# Patient Record
Sex: MALE | Race: WHITE | NOT HISPANIC OR LATINO | URBAN - METROPOLITAN AREA
[De-identification: names, ages, dates, MRNs, and addresses within clinical notes are randomized per-mention and may not be internally consistent; named-entity substitution may affect disease eponyms.]

---

## 2017-10-03 ENCOUNTER — EMERGENCY (EMERGENCY)
Facility: HOSPITAL | Age: 57
LOS: 0 days | Discharge: HOME | End: 2017-10-03
Admitting: INTERNAL MEDICINE

## 2017-10-03 ENCOUNTER — EMERGENCY (EMERGENCY)
Facility: HOSPITAL | Age: 57
LOS: 0 days | Discharge: HOME | End: 2017-10-04
Admitting: INTERNAL MEDICINE

## 2017-10-03 DIAGNOSIS — R51 HEADACHE: ICD-10-CM

## 2017-10-03 DIAGNOSIS — R94.31 ABNORMAL ELECTROCARDIOGRAM [ECG] [EKG]: ICD-10-CM

## 2017-10-03 DIAGNOSIS — F17.200 NICOTINE DEPENDENCE, UNSPECIFIED, UNCOMPLICATED: ICD-10-CM

## 2017-10-03 DIAGNOSIS — Z87.891 PERSONAL HISTORY OF NICOTINE DEPENDENCE: ICD-10-CM

## 2017-10-03 DIAGNOSIS — Z79.899 OTHER LONG TERM (CURRENT) DRUG THERAPY: ICD-10-CM

## 2017-10-03 DIAGNOSIS — K21.9 GASTRO-ESOPHAGEAL REFLUX DISEASE WITHOUT ESOPHAGITIS: ICD-10-CM

## 2017-10-03 DIAGNOSIS — I10 ESSENTIAL (PRIMARY) HYPERTENSION: ICD-10-CM

## 2017-10-03 DIAGNOSIS — R11.0 NAUSEA: ICD-10-CM

## 2017-10-04 ENCOUNTER — TRANSCRIPTION ENCOUNTER (OUTPATIENT)
Age: 57
End: 2017-10-04

## 2017-10-04 PROBLEM — Z00.00 ENCOUNTER FOR PREVENTIVE HEALTH EXAMINATION: Status: ACTIVE | Noted: 2017-10-04

## 2018-05-17 ENCOUNTER — APPOINTMENT (OUTPATIENT)
Dept: CARDIOLOGY | Facility: CLINIC | Age: 58
End: 2018-05-17

## 2021-11-29 ENCOUNTER — EMERGENCY (EMERGENCY)
Facility: HOSPITAL | Age: 61
LOS: 0 days | Discharge: HOME | End: 2021-11-29
Attending: STUDENT IN AN ORGANIZED HEALTH CARE EDUCATION/TRAINING PROGRAM | Admitting: STUDENT IN AN ORGANIZED HEALTH CARE EDUCATION/TRAINING PROGRAM
Payer: MEDICAID

## 2021-11-29 VITALS
SYSTOLIC BLOOD PRESSURE: 162 MMHG | HEART RATE: 73 BPM | DIASTOLIC BLOOD PRESSURE: 89 MMHG | RESPIRATION RATE: 18 BRPM | WEIGHT: 177.03 LBS | OXYGEN SATURATION: 98 % | TEMPERATURE: 98 F

## 2021-11-29 DIAGNOSIS — S01.112A LACERATION WITHOUT FOREIGN BODY OF LEFT EYELID AND PERIOCULAR AREA, INITIAL ENCOUNTER: ICD-10-CM

## 2021-11-29 DIAGNOSIS — S09.90XA UNSPECIFIED INJURY OF HEAD, INITIAL ENCOUNTER: ICD-10-CM

## 2021-11-29 DIAGNOSIS — R11.0 NAUSEA: ICD-10-CM
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
## 2021-11-29 DIAGNOSIS — R55 SYNCOPE AND COLLAPSE: ICD-10-CM
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
## 2021-11-29 DIAGNOSIS — F10.129 ALCOHOL ABUSE WITH INTOXICATION, UNSPECIFIED: ICD-10-CM
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
## 2021-11-29 DIAGNOSIS — Z23 ENCOUNTER FOR IMMUNIZATION: ICD-10-CM

## 2021-11-29 DIAGNOSIS — S01.81XA LACERATION WITHOUT FOREIGN BODY OF OTHER PART OF HEAD, INITIAL ENCOUNTER: ICD-10-CM

## 2021-11-29 LAB
ALBUMIN SERPL ELPH-MCNC: 4.4 G/DL — SIGNIFICANT CHANGE UP (ref 3.5–5.2)
ALP SERPL-CCNC: 91 U/L — SIGNIFICANT CHANGE UP (ref 30–115)
ALT FLD-CCNC: 17 U/L — SIGNIFICANT CHANGE UP (ref 0–41)
ANION GAP SERPL CALC-SCNC: 23 MMOL/L — HIGH (ref 7–14)
AST SERPL-CCNC: 29 U/L — SIGNIFICANT CHANGE UP (ref 0–41)
BASOPHILS # BLD AUTO: 0.1 K/UL — SIGNIFICANT CHANGE UP (ref 0–0.2)
BASOPHILS NFR BLD AUTO: 0.6 % — SIGNIFICANT CHANGE UP (ref 0–1)
BILIRUB SERPL-MCNC: 0.3 MG/DL — SIGNIFICANT CHANGE UP (ref 0.2–1.2)
BUN SERPL-MCNC: 21 MG/DL — HIGH (ref 10–20)
CALCIUM SERPL-MCNC: 9.8 MG/DL — SIGNIFICANT CHANGE UP (ref 8.5–10.1)
CHLORIDE SERPL-SCNC: 104 MMOL/L — SIGNIFICANT CHANGE UP (ref 98–110)
CO2 SERPL-SCNC: 13 MMOL/L — LOW (ref 17–32)
CREAT SERPL-MCNC: 1.4 MG/DL — SIGNIFICANT CHANGE UP (ref 0.7–1.5)
EOSINOPHIL # BLD AUTO: 0.07 K/UL — SIGNIFICANT CHANGE UP (ref 0–0.7)
EOSINOPHIL NFR BLD AUTO: 0.4 % — SIGNIFICANT CHANGE UP (ref 0–8)
GLUCOSE SERPL-MCNC: 99 MG/DL — SIGNIFICANT CHANGE UP (ref 70–99)
HCT VFR BLD CALC: 50 % — SIGNIFICANT CHANGE UP (ref 42–52)
HGB BLD-MCNC: 16.3 G/DL — SIGNIFICANT CHANGE UP (ref 14–18)
IMM GRANULOCYTES NFR BLD AUTO: 1.1 % — HIGH (ref 0.1–0.3)
LYMPHOCYTES # BLD AUTO: 1.65 K/UL — SIGNIFICANT CHANGE UP (ref 1.2–3.4)
LYMPHOCYTES # BLD AUTO: 10.4 % — LOW (ref 20.5–51.1)
MCHC RBC-ENTMCNC: 30.9 PG — SIGNIFICANT CHANGE UP (ref 27–31)
MCHC RBC-ENTMCNC: 32.6 G/DL — SIGNIFICANT CHANGE UP (ref 32–37)
MCV RBC AUTO: 94.7 FL — HIGH (ref 80–94)
MONOCYTES # BLD AUTO: 0.96 K/UL — HIGH (ref 0.1–0.6)
MONOCYTES NFR BLD AUTO: 6 % — SIGNIFICANT CHANGE UP (ref 1.7–9.3)
NEUTROPHILS # BLD AUTO: 12.94 K/UL — HIGH (ref 1.4–6.5)
NEUTROPHILS NFR BLD AUTO: 81.5 % — HIGH (ref 42.2–75.2)
NRBC # BLD: 0 /100 WBCS — SIGNIFICANT CHANGE UP (ref 0–0)
PLATELET # BLD AUTO: 265 K/UL — SIGNIFICANT CHANGE UP (ref 130–400)
POTASSIUM SERPL-MCNC: 4.3 MMOL/L — SIGNIFICANT CHANGE UP (ref 3.5–5)
POTASSIUM SERPL-SCNC: 4.3 MMOL/L — SIGNIFICANT CHANGE UP (ref 3.5–5)
PROT SERPL-MCNC: 7.3 G/DL — SIGNIFICANT CHANGE UP (ref 6–8)
RBC # BLD: 5.28 M/UL — SIGNIFICANT CHANGE UP (ref 4.7–6.1)
RBC # FLD: 13.1 % — SIGNIFICANT CHANGE UP (ref 11.5–14.5)
SODIUM SERPL-SCNC: 140 MMOL/L — SIGNIFICANT CHANGE UP (ref 135–146)
TROPONIN T SERPL-MCNC: <0.01 NG/ML — SIGNIFICANT CHANGE UP
WBC # BLD: 15.9 K/UL — HIGH (ref 4.8–10.8)
WBC # FLD AUTO: 15.9 K/UL — HIGH (ref 4.8–10.8)

## 2021-11-29 PROCEDURE — 99285 EMERGENCY DEPT VISIT HI MDM: CPT | Mod: 25

## 2021-11-29 PROCEDURE — 93010 ELECTROCARDIOGRAM REPORT: CPT

## 2021-11-29 PROCEDURE — 12011 RPR F/E/E/N/L/M 2.5 CM/<: CPT

## 2021-11-29 PROCEDURE — 70450 CT HEAD/BRAIN W/O DYE: CPT | Mod: 26,MA

## 2021-11-29 PROCEDURE — 72125 CT NECK SPINE W/O DYE: CPT | Mod: 26,MA

## 2021-11-29 PROCEDURE — 70486 CT MAXILLOFACIAL W/O DYE: CPT | Mod: 26,MA

## 2021-11-29 RX ORDER — ACETAMINOPHEN 500 MG
975 TABLET ORAL ONCE
Refills: 0 | Status: COMPLETED | OUTPATIENT
Start: 2021-11-29 | End: 2021-11-29

## 2021-11-29 RX ORDER — TETANUS TOXOID, REDUCED DIPHTHERIA TOXOID AND ACELLULAR PERTUSSIS VACCINE, ADSORBED 5; 2.5; 8; 8; 2.5 [IU]/.5ML; [IU]/.5ML; UG/.5ML; UG/.5ML; UG/.5ML
0.5 SUSPENSION INTRAMUSCULAR ONCE
Refills: 0 | Status: COMPLETED | OUTPATIENT
Start: 2021-11-29 | End: 2021-11-29

## 2021-11-29 RX ADMIN — Medication 975 MILLIGRAM(S): at 20:07

## 2021-11-29 RX ADMIN — TETANUS TOXOID, REDUCED DIPHTHERIA TOXOID AND ACELLULAR PERTUSSIS VACCINE, ADSORBED 0.5 MILLILITER(S): 5; 2.5; 8; 8; 2.5 SUSPENSION INTRAMUSCULAR at 20:08

## 2021-11-29 NOTE — ED PROVIDER NOTE - PATIENT PORTAL LINK FT
You can access the FollowMyHealth Patient Portal offered by [facility] by registering at the following website: [email or web address]. By joining [employer]’s FollowMyHealth portal, you will also be able to view your health information using other applications (apps) compatible with our system.

## 2021-11-29 NOTE — ED PROVIDER NOTE - NSFOLLOWUPINSTRUCTIONS_ED_ALL_ED_FT
Laceration    A laceration is a cut that goes through all of the layers of the skin and into the tissue that is right under the skin. Some lacerations heal on their own. Others need to be closed with skin adhesive strips, skin glue, stitches (sutures), or staples. Proper laceration care minimizes the risk of infection and helps the laceration to heal better.  If non-absorbable stitches or staples have been placed, they must be taken out within the time frame instructed by your healthcare provider.    SEEK IMMEDIATE MEDICAL CARE IF YOU HAVE ANY OF THE FOLLOWING SYMPTOMS: swelling around the wound, worsening pain, drainage from the wound, red streaking going away from your wound, inability to move finger or toe near the laceration, or discoloration of skin near the laceration.    Syncope    Syncope is when you temporarily lose consciousness, also called fainting or passing out. It is caused by a sudden decrease in blood flow to the brain. Even though most causes of syncope are not dangerous, syncope can possibly be a sign of a serious medical problem. Signs that you may be about to faint include feeling dizzy, lightheaded, nausea, visual changes, or cold/clammy skin. Do not drive, operate heavy machinery, or play sports until your health care provider says it is okay.    SEEK IMMEDIATE MEDICAL CARE IF YOU HAVE ANY OF THE FOLLOWING SYMPTOMS: severe headache, pain in your chest/abdomen/back, bleeding from your mouth or rectum, palpitations, shortness of breath, pain with breathing, seizure, confusion, or trouble walking.

## 2021-11-29 NOTE — ED PROVIDER NOTE - CARE PROVIDER_API CALL
Brianne Lawson)  Internal Medicine  [address] [place] [address]  Phone: [phone]  Fax: [phone]  Follow Up Time: 1-3 Days    Daryl Werner  Cardiology  [address] [place] [address]  Phone: [phone]  Fax: [phone]  Follow Up Time: 1-3 Days    Yue Vaughan)  [facility]  [address] [place] [id number]  Phone: [phone]  Fax: [phone]  Follow Up Time: 1-3 Days

## 2021-11-29 NOTE — ED PROVIDER NOTE - ATTENDING CONTRIBUTION TO CARE
61 year old male no pmh presents here s/p mechanical fall. Patient states he had 1 drink of titors and cranberry, tripped over [employer] decorations and fell. + head trauma not on a/c initially no loc. PAtient later smoked marijuana felt lightheaded & synopsized. Patient endorses skippin a meal today. No blurry vision no neck pain numbnes/stingling cp sob n/v abdominal pain. Tetanas not up to date  on exam  CONSTITUTIONAL: WA / WN / NAD  HEAD: +left eyebrow hematoma 1cm laceration + abrasion on left maxilla.  EYES: PERRL; EOMI; anicteric.  ENT: Normal pharynx; mucous membranes pink/moist, no erythema.  NECK: Supple; no c spine ttp  CARD: RRR; nl S1/S2; no M/R/G.   RESP: Respiratory rate and effort are normal; breath sounds clear and equal bilaterally.  ABD: Soft, NT ND   MSK/EXT: No gross deformities; full range of motion. No midline CTLS  SKIN: Warm and dry;   NEURO: AAOx3, Motor 5/5 x 4 extremities No dymsetria no dysarthria negative drift. Ambulating in the ED  PSYCH: Memory Intact, Normal Affect

## 2021-11-29 NOTE — ED ADULT TRIAGE NOTE - CHIEF COMPLAINT QUOTE
Patient was decorating his house for [employer] and had a trip and fall onto sidewalk. Has a laceration to left eyebrow. Patient then had an episode of dizziness and daughter caught him. No dizziness at this time.

## 2021-11-29 NOTE — ED PROVIDER NOTE - CARE PROVIDERS DIRECT ADDRESSES
[email or web address]

## 2021-11-29 NOTE — ED ADULT NURSE NOTE - CHIEF COMPLAINT QUOTE
Patient was decorating his house for [employer] and had a trip and fall onto sidewalk. Has a laceration to left eyebrow. Patient then had an episode of dizziness and daughter caught him. No dizziness at this time.

## 2021-11-29 NOTE — ED PROVIDER NOTE - CLINICAL SUMMARY MEDICAL DECISION MAKING FREE TEXT BOX
61 year old male no pmh presents here s/p mechanical fall. Patient states he had 1 drink of titors and cranberry, tripped over [employer] decorations and fell. + head trauma not on a/c initially no loc. PAtient later smoked marijuana felt lightheaded & synopsized. Patient endorses skippin a meal today. No blurry vision no neck pain numbnes/stingling cp sob n/v abdominal pain.   VS reviewed. [facility] imaging ekg obtained and reviewed. 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 It is not clear whether this is acute or chronic. Please correlate with point tenderness. patient re-evaluated. patient has no midline C spine or parapsinal muscle ttp. Results discussed with patient and wife together. No pain able to freely move neck. Does not want to stay. Wife has a cardiologist dr palm that they will be following up for syncope. Strict return precautions given to wife and patient. 61 year old male no pmh presents here s/p mechanical fall. Patient states he had 1 drink of titors and cranberry, tripped over [employer] decorations and fell. + head trauma not on a/c initially no loc. PAtient later smoked marijuana felt lightheaded & synopsized. Patient endorses skippin a meal today. No blurry vision no neck pain numbnes/stingling cp sob n/v abdominal pain.   VS reviewed. [facility] imaging ekg obtained and reviewed. 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 It is not clear whether this is acute or chronic. Please correlate with point tenderness. patient re-evaluated. patient has no midline C spine or parapsinal muscle ttp. Results discussed with patient and wife together. No pain able to freely move neck. Does not want to stay. Wife has a cardiologist dr palm that they will be following up for syncope. Strict return precautions given to wife and patient. Laceration repaired with 3 sutures.

## 2021-11-29 NOTE — ED PROVIDER NOTE - CARE PLAN
1 Principal Discharge DX:	Laceration of left eyebrow  Secondary Diagnosis:	Fall  Secondary Diagnosis:	Syncope

## 2021-11-29 NOTE — ED PROVIDER NOTE - PROGRESS NOTE DETAILS
SR: CT scans noted-  patient re-evaluated. patient has no midline C spine or parapsinal muscle ttp. Results discussed with patient and wife together. No pain able to freely move neck. Does not want to stay. Wife has a cardiologist dr palm that they will be following up for syncope. Strict return precautions given to wife and patient.

## 2021-11-29 NOTE — ED PROVIDER NOTE - PROVIDER TOKENS
PROVIDER:[TOKEN:[[id number]],FOLLOWUP:[1-3 Days]],PROVIDER:[TOKEN:[[id number]],FOLLOWUP:[1-3 Days]],PROVIDER:[TOKEN:[[id number]],FOLLOWUP:[1-3 Days]]

## 2021-11-29 NOTE — ED PROVIDER NOTE - PHYSICAL EXAMINATION
GENERAL: Well-nourished, Well-developed. NAD.  HEAD: (+)L eyebrow 1.5 cm linear laceration and hematoma. (+)L maxillary abrasion and ttp. No ecchymosis behind ears B/L.  Eyes: PERRLA, EOMI, no entrapment. No asymmetry. No nystagmus. No conjunctival injection. Non-icteric sclera.  ENMT: MMM.  Neck: Supple. No cervical midline TTP. No paravertebral TTP to traps. FROM  CVS: Normal S1,S2. No murmurs appreciated on auscultation   RESP: No use of accessory muscles. Chest rise symmetrical with good expansion. Lungs clear to auscultation B/L. No wheezing, rales, or rhonchi auscultated.  GI: Normal auscultation of bowel sounds in all 4 quadrants. Soft, Nontender, Nondistended. No guarding or rebound tenderness.   MSK: Extremities w/o deformity or ttp. No visible signs of trauma such as ecchymosis, erythema, or swelling. FROM of upper and lower extremities B/L. No midline spinal TTP. FROM of back with flexion and extension.  Skin: (+)L eyebrow 1.5cm laceration. (+)L maxillary region abrasion  Neuro: AA&O x 3. CNs II-XII grossly intact. Speaking in full sentences. No slurring of speech. No facial droop. No tremors. Sensation grossly intact. Strength 5/5 B/L. Gait within normal limits. Negative Romberg and Pronator Drift. Normal Finger to nose exam. Visual fields intact.

## 2021-11-29 NOTE — ED PROVIDER NOTE - IV ALTEPLASE INCLUSION HIDDEN
show Back Pain    Back pain is very common in adults. The cause of back pain is rarely dangerous and the pain often gets better over time. The cause of your back pain may not be known and may include strain of muscles or ligaments, degeneration of the spinal disks, or arthritis. Occasionally the pain may radiate down your leg(s). Over-the-counter medicines to reduce pain and inflammation are often the most helpful. Stretching and remaining active frequently helps the healing process.     SEEK IMMEDIATE MEDICAL CARE IF YOU HAVE ANY OF THE FOLLOWING SYMPTOMS: bowel or bladder control problems, unusual weakness or numbness in your arms or legs, nausea or vomiting, abdominal pain, fever, dizziness/lightheadedness.      1)  PLEASE FOLLOW-UP WITH YOUR PRIMARY CARE DOCTOR OR REFERRED SPECIALIST IN 1-2 DAYS.     2)  BRING ALL PAPERWORK FROM TODAY'S EMERGENCY ROOM  VISIT TO YOUR FOLLOW-UP VISIT.  IF YOU DO NOT HAVE A PRIMARY CARE DOCTOR PLEASE REFER TO THE OFFICE/CLINIC INFORMATION GIVEN ABOVE.  YOU MAY ALSO CALL [phone] AND ASK FOR MS. JOSE SANCHEZ.  SHE CAN HELP YOU MAKE A FOLLOW-UP APPOINTMENT.  HER HOURS ARE 11AM-7PM MONDAY - FRIDAY.    3) PLEASE RETURN TO THE ER IMMEDIATELY OR CALL 911 FOR ANY HIGH FEVER, TROUBLE BREATHING, CHEST PAIN, WEAKNESS, VOMITING, SEVERE PAIN, OR ANY OTHER CONCERNS.

## 2021-11-29 NOTE — ED PROVIDER NOTE - NS ED ROS FT
Constitutional: (-) fever (-) malaise (-) diaphoresis (-) chills (-) wt. loss (-) bodyaches (-) night sweats  Eyes: (-) visual changes (-) eye pain (-) eye discharge (-) photophobia (-) FB sensation  Neck: (-) neck pain (-) neck stiffness  Cardiac: (-) chest pain  (-) palpitations (+) syncope (-) edema  Respiratory: (-) cough (-) hemoptysis (-) history of asthma or COPD (-) SOB (-) RUSSELL  GI: (+) nausea (-) vomiting (-) diarrhea (-) abdominal pain   : (-) dysuria (-) increased frequency  (-) hematuria (-) incontinence  MS: (-) back pain (-) myalgia (-) muscle weakness (-)  joint pain  Neuro: (-) headache (-) dizziness (-) numbness/tingling to extremities B/L (-) weakness (-) LOC (+) head injury (-) AMS   Skin: (-) rash (+) laceration    Except as documented in the HPI, all other systems are negative.

## 2021-12-04 ENCOUNTER — EMERGENCY (EMERGENCY)
Facility: HOSPITAL | Age: 61
LOS: 0 days | Discharge: HOME | End: 2021-12-04
Attending: EMERGENCY MEDICINE | Admitting: EMERGENCY MEDICINE
Payer: MEDICAID

## 2021-12-04 VITALS
HEART RATE: 85 BPM | RESPIRATION RATE: 16 BRPM | OXYGEN SATURATION: 96 % | TEMPERATURE: 97 F | DIASTOLIC BLOOD PRESSURE: 114 MMHG | WEIGHT: 177.03 LBS | SYSTOLIC BLOOD PRESSURE: 199 MMHG

## 2021-12-04 VITALS
RESPIRATION RATE: 18 BRPM | OXYGEN SATURATION: 97 % | DIASTOLIC BLOOD PRESSURE: 102 MMHG | SYSTOLIC BLOOD PRESSURE: 170 MMHG | HEART RATE: 69 BPM | TEMPERATURE: 98 F
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
## 2021-12-04 DIAGNOSIS — S01.112D LACERATION WITHOUT FOREIGN BODY OF LEFT EYELID AND PERIOCULAR AREA, SUBSEQUENT ENCOUNTER: ICD-10-CM

## 2021-12-04 DIAGNOSIS — R11.2 NAUSEA WITH VOMITING, UNSPECIFIED: ICD-10-CM

## 2021-12-04 DIAGNOSIS — I16.0 HYPERTENSIVE URGENCY: ICD-10-CM
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
## 2021-12-04 LAB
ALBUMIN SERPL ELPH-MCNC: 4.3 G/DL — SIGNIFICANT CHANGE UP (ref 3.5–5.2)
ALP SERPL-CCNC: 95 U/L — SIGNIFICANT CHANGE UP (ref 30–115)
ALT FLD-CCNC: 19 U/L — SIGNIFICANT CHANGE UP (ref 0–41)
ANION GAP SERPL CALC-SCNC: 14 MMOL/L — SIGNIFICANT CHANGE UP (ref 7–14)
AST SERPL-CCNC: 21 U/L — SIGNIFICANT CHANGE UP (ref 0–41)
BASOPHILS # BLD AUTO: 0.05 K/UL — SIGNIFICANT CHANGE UP (ref 0–0.2)
BASOPHILS NFR BLD AUTO: 0.5 % — SIGNIFICANT CHANGE UP (ref 0–1)
BILIRUB SERPL-MCNC: 0.6 MG/DL — SIGNIFICANT CHANGE UP (ref 0.2–1.2)
BUN SERPL-MCNC: 25 MG/DL — HIGH (ref 10–20)
CALCIUM SERPL-MCNC: 8.9 MG/DL — SIGNIFICANT CHANGE UP (ref 8.5–10.1)
CHLORIDE SERPL-SCNC: 102 MMOL/L — SIGNIFICANT CHANGE UP (ref 98–110)
CO2 SERPL-SCNC: 23 MMOL/L — SIGNIFICANT CHANGE UP (ref 17–32)
CREAT SERPL-MCNC: 1.2 MG/DL — SIGNIFICANT CHANGE UP (ref 0.7–1.5)
EOSINOPHIL # BLD AUTO: 0.22 K/UL — SIGNIFICANT CHANGE UP (ref 0–0.7)
EOSINOPHIL NFR BLD AUTO: 2.1 % — SIGNIFICANT CHANGE UP (ref 0–8)
GLUCOSE SERPL-MCNC: 112 MG/DL — HIGH (ref 70–99)
HCT VFR BLD CALC: 49.8 % — SIGNIFICANT CHANGE UP (ref 42–52)
HGB BLD-MCNC: 16.7 G/DL — SIGNIFICANT CHANGE UP (ref 14–18)
IMM GRANULOCYTES NFR BLD AUTO: 0.6 % — HIGH (ref 0.1–0.3)
LYMPHOCYTES # BLD AUTO: 1.39 K/UL — SIGNIFICANT CHANGE UP (ref 1.2–3.4)
LYMPHOCYTES # BLD AUTO: 13.4 % — LOW (ref 20.5–51.1)
MCHC RBC-ENTMCNC: 31 PG — SIGNIFICANT CHANGE UP (ref 27–31)
MCHC RBC-ENTMCNC: 33.5 G/DL — SIGNIFICANT CHANGE UP (ref 32–37)
MCV RBC AUTO: 92.6 FL — SIGNIFICANT CHANGE UP (ref 80–94)
MONOCYTES # BLD AUTO: 0.84 K/UL — HIGH (ref 0.1–0.6)
MONOCYTES NFR BLD AUTO: 8.1 % — SIGNIFICANT CHANGE UP (ref 1.7–9.3)
NEUTROPHILS # BLD AUTO: 7.8 K/UL — HIGH (ref 1.4–6.5)
NEUTROPHILS NFR BLD AUTO: 75.3 % — HIGH (ref 42.2–75.2)
NRBC # BLD: 0 /100 WBCS — SIGNIFICANT CHANGE UP (ref 0–0)
PLATELET # BLD AUTO: 255 K/UL — SIGNIFICANT CHANGE UP (ref 130–400)
POTASSIUM SERPL-MCNC: 3.5 MMOL/L — SIGNIFICANT CHANGE UP (ref 3.5–5)
POTASSIUM SERPL-SCNC: 3.5 MMOL/L — SIGNIFICANT CHANGE UP (ref 3.5–5)
PROT SERPL-MCNC: 6.6 G/DL — SIGNIFICANT CHANGE UP (ref 6–8)
RBC # BLD: 5.38 M/UL — SIGNIFICANT CHANGE UP (ref 4.7–6.1)
RBC # FLD: 13.2 % — SIGNIFICANT CHANGE UP (ref 11.5–14.5)
SARS-COV-2 RNA SPEC QL NAA+PROBE: SIGNIFICANT CHANGE UP
SODIUM SERPL-SCNC: 139 MMOL/L — SIGNIFICANT CHANGE UP (ref 135–146)
TROPONIN T SERPL-MCNC: <0.01 NG/ML — SIGNIFICANT CHANGE UP
WBC # BLD: 10.36 K/UL — SIGNIFICANT CHANGE UP (ref 4.8–10.8)
WBC # FLD AUTO: 10.36 K/UL — SIGNIFICANT CHANGE UP (ref 4.8–10.8)

## 2021-12-04 PROCEDURE — 93010 ELECTROCARDIOGRAM REPORT: CPT

## 2021-12-04 PROCEDURE — 99285 EMERGENCY DEPT VISIT HI MDM: CPT

## 2021-12-04 PROCEDURE — 70450 CT HEAD/BRAIN W/O DYE: CPT | Mod: 26,MA

## 2021-12-04 RX ORDER — LABETALOL HCL 100 MG
10 TABLET ORAL ONCE
Refills: 0 | Status: COMPLETED | OUTPATIENT
Start: 2021-12-04 | End: 2021-12-04

## 2021-12-04 RX ADMIN — Medication 10 MILLIGRAM(S): at 19:11

## 2021-12-04 NOTE — ED PROVIDER NOTE - CARE PLAN
1 Principal Discharge DX:	Visit for suture removal   Principal Discharge DX:	Visit for suture removal  Secondary Diagnosis:	Hypertensive urgency

## 2021-12-04 NOTE — ED ADULT NURSE NOTE - NSIMPLEMENTINTERV_GEN_ALL_ED
Implemented All Universal Safety Interventions:  [place] to call system. Call bell, personal items and telephone within reach. Instruct patient to call for assistance. Room bathroom lighting operational. Non-slip footwear when patient is off stretcher. Physically safe environment: no spills, clutter or unnecessary equipment. Stretcher in lowest position, wheels locked, appropriate side rails in place.

## 2021-12-04 NOTE — ED PROVIDER NOTE - NS ED ROS FT
CONST: No fever, chills or bodyaches  EYES: No pain, redness, drainage or visual changes.  ENT: No ear pain or discharge, nasal discharge or congestion. No sore throat  CARD: No chest pain, palpitations  RESP: No SOB, cough, hemoptysis. No hx of asthma or COPD  GI: No abdominal pain, (+) N/V. No Diarrhea  : No urinary symptoms  MS: No joint pain, back pain or extremity pain/injury  SKIN: No rashes  NEURO: No headache, dizziness, paresthesias or LOC

## 2021-12-04 NOTE — ED ADULT NURSE REASSESSMENT NOTE - NS ED NURSE REASSESS COMMENT FT1
patient requested to leave AMA. MD made aware. Patient educated on the dangers and even possible death by leaving AMA. Patient verbalized understanding and still requested to leave. AMA. Patient signed all paperwork.  Patient A&Ox4, ambulates with steady gait.  IV access removed and pressure bandage applied. No bleeding noted.

## 2021-12-04 NOTE — ED PROVIDER NOTE - PHYSICAL EXAMINATION
Physical Exam    Vital Signs: I have reviewed the initial vital signs.  Constitutional: well-nourished, appears stated age, no acute distress  Eyes: Conjunctiva pink, Sclera clear, PERRLA, EOMI without pain. negative hyphema. (+) healing left subconjunctival hemorrhage.  ENT: Oropharynx is clear with lesions. uvula midline. no tonsillar erythema, edema, or exudates. no stridor. pta pta. floor of the mouth is soft without pus.   Cardiovascular: S1 and S2, regular rate, regular rhythm, well-perfused extremities, radial pulses equal and 2+ b/l.   Respiratory: unlabored respiratory effort, clear to auscultation bilaterally no wheezing, rales and rhonchi. pt is speaking full sentences. no accessory muscle use.   Gastrointestinal: soft, non-tender, nondistended abdomen, no pulsatile mass, normal bowl sounds, no rebound, no guarding, no organomegaly.   Musculoskeletal: FROM of b/l upper and lower extremities.   Integumentary: warm, dry, no rash. (+) scab inferior to the left orbit healing well. laceration to the left eyebrow scabbed over and healing well. no erythema, edema, or drainage from the sites.   Neurologic: awake, alert, cranial nerves II-XII grossly intact, extremities’ motor and sensory functions grossly intact. finger to nose intact. negative pronator drift. negative romberg. steady gait. 5/5 strength throughout.   Psychiatric: appropriate mood, appropriate affect Physical Exam    Vital Signs: I have reviewed the initial vital signs.  Constitutional: well-nourished, appears stated age, no acute distress  Eyes: Conjunctiva pink, Sclera clear, PERRLA, EOMI without pain. negative hyphema. (+) healing left subconjunctival hemorrhage.  Cardiovascular: S1 and S2, regular rate, regular rhythm, well-perfused extremities, radial pulses equal and 2+ b/l.   Respiratory: unlabored respiratory effort, clear to auscultation bilaterally no wheezing, rales and rhonchi. pt is speaking full sentences. no accessory muscle use.   Gastrointestinal: soft, non-tender, nondistended abdomen, no pulsatile mass, normal bowl sounds, no rebound, no guarding, no organomegaly.   Musculoskeletal: FROM of b/l upper and lower extremities.   Integumentary: warm, dry, no rash. (+) scab inferior to the left orbit healing well. laceration to the left eyebrow scabbed over and healing well. no erythema, edema, or drainage from the sites.   Neurologic: atraumatic, normocephalic skull. awake, alert, cranial nerves II-XII grossly intact, extremities’ motor and sensory functions grossly intact. finger to nose intact. negative pronator drift. negative romberg. steady gait. 5/5 strength throughout.   Psychiatric: appropriate mood, appropriate affect

## 2021-12-04 NOTE — ED PROVIDER NOTE - NSFOLLOWUPCLINICS_GEN_ALL_ED_FT
[facility]  Concussion Program  [address] [place] [address]   Phone: [phone]  Fax:   Follow Up Time: 1-3 Days

## 2021-12-04 NOTE — ED PROVIDER NOTE - PROGRESS NOTE DETAILS
ATTENDING NOTE: I personally evaluated the patient. I reviewed the Physician Assistant’s note (as assigned above), and agree with the findings and plan except as documented in my note.   62 y/o M presented to the ED 4 days ago s/p mechanical trip and fall with (+) head trauma, sustaining a laceration over his L eyebrow, had 3 sutures placed and was DC’d now returned for suture removal from eyebrow. In ED, pt notes increased fatigue and vomiting today. Notes one episode of vomiting yesterday and one today.   Const: Well nourished, well developed, appears stated age. Eyes: PERRL, no conjunctival injection. HENT:  Neck supple without meningismus. CV: RRR, Warm, well-perfused extremities. RESP: CTA B/L, no tachypnea. GI: soft, non-tender, non-distended. MSK: No gross deformities appreciated. Skin: Warm, dry. (+) 3 sutures to his left eyebrow c/d/i with some ecchymosis and scab around L eye, and injected conjunctiva. No rashes. AAOx3. Motor 5/5 and sensation intact throughout UE and LE. CN II-XII intact. No facial droop or slurring of speech. No dysmetria w/ ftn or rapid alternating fine movements, ambulating with no ataxia or difficulty. NIH O. Psych: Appropriate mood and affect.   Concern for concussion, however, will do repeat CT to reassure pt doesn’t have ICH. FF: discussed radiology results with pt. sutures removed. advised of return precautions. agreeable to dc. F: discussed radiology results with pt. sutures removed. pt repeat blood pressure is still elevate. will evaluate for hypertensive urgency/emergency. will reasses. FF: pt blood pressure still high. discussed plan to admit pt for hypertensive urgency. pt reports he wants to sign out ama. pt made aware of the risk of signing out ama, including death. pt aware of risk and insist on signing out ama. pt is capable of making decision for himself. a&o x3. pt wife is at bedside. pt reports he will f/u with pcp, and will make an appt with wifes cardiologist. dr. ernandez. signed ama papers. FF: pt blood pressure still high. discussed plan to admit pt for hypertensive urgency. pt reports he wants to sign out ama. pt made aware of the risk of signing out ama, including death. pt aware of risk and insist on signing out ama. pt is capable of making decision for himself. a&o x3. pt wife is at bedside. pt reports he will f/u with pcp, and will make an appt with wife's cardiologist. dr. ernandez. signed ama papers.

## 2021-12-04 NOTE — ED PROVIDER NOTE - ATTENDING CONTRIBUTION TO CARE
ATTENDING NOTE: I personally evaluated the patient. I reviewed the Physician Assistant’s note (as assigned above), and agree with the findings and plan except as documented in my note.     60 y/o M presented to the ED 4 days ago s/p mechanical trip and fall with (+) head trauma, sustaining a laceration over his L eyebrow, had 3 sutures placed and was DC’d now returned for suture removal from eyebrow. In ED, pt notes increased fatigue and vomiting today. Notes one episode of vomiting yesterday and one today.     Const: Well nourished, well developed, appears stated age. Eyes: PERRL, no conjunctival injection. HENT:  Neck supple without meningismus. CV: RRR, Warm, well-perfused extremities. RESP: CTA B/L, no tachypnea. GI: soft, non-tender, non-distended. MSK: No gross deformities appreciated. Skin: Warm, dry. (+) 3 sutures to his left eyebrow c/d/i with some ecchymosis and scab around L eye, and injected conjunctiva. No rashes. AAOx3. Motor 5/5 and sensation intact throughout UE and LE. CN II-XII intact. No facial droop or slurring of speech. No dysmetria w/ ftn or rapid alternating fine movements, ambulating with no ataxia or difficulty. NIH O. Psych: Appropriate mood and affect.     Concern for concussion, however, will do repeat CT to reassure pt doesn’t have ICH.

## 2021-12-04 NOTE — ED PROVIDER NOTE - PATIENT PORTAL LINK FT
You can access the FollowMyHealth Patient Portal offered by [facility] by registering at the following website: [email or web address]. By joining [employer]’s FollowMyHealth portal, you will also be able to view your health information using other applications (apps) compatible with our system. You can access the FollowMyHealth Patient Portal offered by [facility] by registering at the following website: [email or web address]. By joining [employer]’s FollowMyHealth portal, you will also be able to view your health information using other applications (apps) compatible with our system.

## 2021-12-04 NOTE — ED PROVIDER NOTE - CLINICAL SUMMARY MEDICAL DECISION MAKING FREE TEXT BOX
62 yo M presented to ED for suture removal. Due to episodes of emesis concern for concussion. Repeat CT head did  not demonstrate any acute abnormality. DC home with concussion clinic. 60 yo M presented to ED for suture removal and emesis. Initially concern was for concussion vs delayed ICH. CT head did not demonstrate any acute pathology. Pt was found to have elevated BP without previous history of HTN. Labs were normal. EKG demonstrated some T-wave inversions. Pt given labetalol with some improvement of BP. Concern for HTN urgency and wanted to admit pt for further evaluation and management. Pt did not want to stay. Informed pt that elevated BP could cause CVA and ICH which could lead to death. PT understands but wants to leave anyway. Pt signed AMA

## 2021-12-04 NOTE — ED ADULT TRIAGE NOTE - CHIEF COMPLAINT QUOTE
Pt here to have sutures removed from left eyebrow. Pt denies signs of infection/pain. Pt fell on Monday, had sutures placed here - now stating he feels increased fatigue & vomited today.

## 2021-12-04 NOTE — ED PROVIDER NOTE - OBJECTIVE STATEMENT
60 y/o male with no significant PMH presents to the ED for evaluation of suture removal s/p mechanical fall 11/29. pt was seen in the ED and has 3 sutures placed to the left eyebrow. pt reports he went to [employer] last night and drank alcohol. pt reports he threw up twice last night. pt denies additional trauma, use of blood thinners, visual changes, headache, chest pain, sob, abdominal pain, current nausea, dairrhea, constipation, dizziness, weakness, numbness, tingling, urinary or bowel retention or incontinence. 60 y/o male with no significant PMH presents to the ED for evaluation of suture removal s/p mechanical fall 11/29. pt was seen in the ED and has 3 sutures placed to the left eyebrow. pt reports he went to [employer] last night and drank alcohol. pt reports he threw up twice last night. pt denies additional trauma, use of blood thinners, visual changes, headache, chest pain, sob, abdominal pain, current nausea, diarrhea, constipation, dizziness, weakness, numbness, tingling, urinary or bowel retention or incontinence.

## 2021-12-09 ENCOUNTER — APPOINTMENT (OUTPATIENT)
Dept: CARDIOLOGY | Facility: CLINIC | Age: 61
End: 2021-12-09
Payer: MEDICAID

## 2021-12-09 ENCOUNTER — RESULT CHARGE (OUTPATIENT)
Age: 61
End: 2021-12-09

## 2021-12-09 VITALS
TEMPERATURE: 97.1 F | WEIGHT: 168 LBS | HEIGHT: 63 IN | HEART RATE: 77 BPM | BODY MASS INDEX: 29.77 KG/M2 | SYSTOLIC BLOOD PRESSURE: 150 MMHG | DIASTOLIC BLOOD PRESSURE: 96 MMHG

## 2021-12-09 PROCEDURE — 93000 ELECTROCARDIOGRAM COMPLETE: CPT

## 2021-12-09 PROCEDURE — 99204 OFFICE O/P NEW MOD 45 MIN: CPT

## 2021-12-09 NOTE — HISTORY OF PRESENT ILLNESS
[FreeTextEntry1] : er work up was negative \par brain ct was negative\par smoking, htn and hypercholesteremia are his risk factors for cad\par denies exertional chest pain\par sob secondary to copd\par negative work up in the past\par denies valvular heart disease\par \par ekg is nsr\par no palps or arrythmias

## 2021-12-16 ENCOUNTER — APPOINTMENT (OUTPATIENT)
Dept: CARDIOLOGY | Facility: CLINIC | Age: 61
End: 2021-12-16
Payer: MEDICAID

## 2021-12-16 DIAGNOSIS — R55 SYNCOPE AND COLLAPSE: ICD-10-CM

## 2021-12-16 PROCEDURE — 93306 TTE W/DOPPLER COMPLETE: CPT

## 2021-12-19 PROBLEM — R55 SYNCOPE: Status: ACTIVE | Noted: 2021-12-19

## 2022-02-02 ENCOUNTER — APPOINTMENT (OUTPATIENT)
Dept: CARDIOLOGY | Facility: CLINIC | Age: 62
End: 2022-02-02

## 2022-02-02 ENCOUNTER — RESULT CHARGE (OUTPATIENT)
Age: 62
End: 2022-02-02

## 2022-03-07 ENCOUNTER — APPOINTMENT (OUTPATIENT)
Dept: CARDIOLOGY | Facility: CLINIC | Age: 62
End: 2022-03-07

## 2022-07-26 ENCOUNTER — APPOINTMENT (OUTPATIENT)
Dept: PLASTIC SURGERY | Facility: CLINIC | Age: 62
End: 2022-07-26

## 2022-07-26 VITALS — HEIGHT: 63 IN | WEIGHT: 170 LBS | BODY MASS INDEX: 30.12 KG/M2

## 2022-07-26 DIAGNOSIS — Z78.9 OTHER SPECIFIED HEALTH STATUS: ICD-10-CM

## 2022-07-26 PROCEDURE — 99203 OFFICE O/P NEW LOW 30 MIN: CPT

## 2022-07-26 NOTE — PHYSICAL EXAM
[NI] : Normal [de-identified] : left scalp well healing bx site   fair amoutn sun damage  male pattern baldness  shaves side of head  approx 1cm

## 2022-07-26 NOTE — ASSESSMENT
[FreeTextEntry1] : Discussed options\par \par Recommend Mohs and me to do recon\par \par Regarding the reconstruction after skin cancer  surgery, we discussed scarring, risk of repeat procedure, poor wound healing, need for additional revisionary surgery,asymmetry, dissatisfaction with the outcome, and unplanned surgery in the future.  All questions were answered.  All risks were well understood by the patient.\par \par Regarding the reconstruction after Mohs surgery, we discussed scarring, risk of repeat procedure, poor wound healing, need for additional revisionary surgery,asymmetry, dissatisfaction with the outcome, and unplanned surgery in the future.  All questions were answered.  All risks were well understood by the patient.\par \par Due to COVID 19, pre-visit patient instructions were explained to the patient and their symptoms were checked upon arrival.  \par Masks were used by the health care providers and staff and the examination room was cleaned after the patient visit was completed.\par \par Pt and wife understand and agree

## 2022-07-26 NOTE — HISTORY OF PRESENT ILLNESS
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 premalignant lesion left posterior neck by James--pt and wife do nto recall specifically

## 2022-07-26 NOTE — PHYSICAL EXAM
[NI] : Normal [de-identified] : left scalp well healing bx site   fair amoutn sun damage  male pattern baldness  shaves side of head  approx 1cm

## 2022-10-05 ENCOUNTER — APPOINTMENT (OUTPATIENT)
Dept: PLASTIC SURGERY | Facility: HOSPITAL | Age: 62
End: 2022-10-05

## 2022-10-12 ENCOUNTER — APPOINTMENT (OUTPATIENT)
Dept: PLASTIC SURGERY | Facility: CLINIC | Age: 62
End: 2022-10-12

## 2022-11-09 ENCOUNTER — RESULT REVIEW (OUTPATIENT)
Age: 62
End: 2022-11-09

## 2022-11-09 ENCOUNTER — OUTPATIENT (OUTPATIENT)
Dept: OUTPATIENT SERVICES | Facility: HOSPITAL | Age: 62
LOS: 1 days | Discharge: HOME | End: 2022-11-09

## 2022-11-09 VITALS
WEIGHT: 171.96 LBS | OXYGEN SATURATION: 99 % | SYSTOLIC BLOOD PRESSURE: 180 MMHG | RESPIRATION RATE: 18 BRPM | HEART RATE: 74 BPM | DIASTOLIC BLOOD PRESSURE: 98 MMHG | TEMPERATURE: 98 F | HEIGHT: 63 IN

## 2022-11-09 DIAGNOSIS — C44.42 SQUAMOUS CELL CARCINOMA OF SKIN OF SCALP AND NECK: ICD-10-CM

## 2022-11-09 DIAGNOSIS — Z01.818 ENCOUNTER FOR OTHER PREPROCEDURAL EXAMINATION: ICD-10-CM

## 2022-11-09 DIAGNOSIS — F17.200 NICOTINE DEPENDENCE, UNSPECIFIED, UNCOMPLICATED: Chronic | ICD-10-CM

## 2022-11-09 LAB
ALBUMIN SERPL ELPH-MCNC: 4.2 G/DL — SIGNIFICANT CHANGE UP (ref 3.5–5.2)
ALP SERPL-CCNC: 90 U/L — SIGNIFICANT CHANGE UP (ref 30–115)
ALT FLD-CCNC: 21 U/L — SIGNIFICANT CHANGE UP (ref 0–41)
ANION GAP SERPL CALC-SCNC: 16 MMOL/L — HIGH (ref 7–14)
APTT BLD: 53.5 SEC — HIGH (ref 27–39.2)
AST SERPL-CCNC: 20 U/L — SIGNIFICANT CHANGE UP (ref 0–41)
BASOPHILS # BLD AUTO: 0.13 K/UL — SIGNIFICANT CHANGE UP (ref 0–0.2)
BASOPHILS NFR BLD AUTO: 1.3 % — HIGH (ref 0–1)
BILIRUB SERPL-MCNC: <0.2 MG/DL — SIGNIFICANT CHANGE UP (ref 0.2–1.2)
BUN SERPL-MCNC: 20 MG/DL — SIGNIFICANT CHANGE UP (ref 10–20)
CALCIUM SERPL-MCNC: 9.6 MG/DL — SIGNIFICANT CHANGE UP (ref 8.4–10.5)
CHLORIDE SERPL-SCNC: 105 MMOL/L — SIGNIFICANT CHANGE UP (ref 98–110)
CO2 SERPL-SCNC: 22 MMOL/L — SIGNIFICANT CHANGE UP (ref 17–32)
CREAT SERPL-MCNC: 0.9 MG/DL — SIGNIFICANT CHANGE UP (ref 0.7–1.5)
EGFR: 97 ML/MIN/1.73M2 — SIGNIFICANT CHANGE UP
EOSINOPHIL # BLD AUTO: 0.23 K/UL — SIGNIFICANT CHANGE UP (ref 0–0.7)
EOSINOPHIL NFR BLD AUTO: 2.2 % — SIGNIFICANT CHANGE UP (ref 0–8)
GLUCOSE SERPL-MCNC: 66 MG/DL — LOW (ref 70–99)
HCT VFR BLD CALC: 47.2 % — SIGNIFICANT CHANGE UP (ref 42–52)
HGB BLD-MCNC: 15.5 G/DL — SIGNIFICANT CHANGE UP (ref 14–18)
IMM GRANULOCYTES NFR BLD AUTO: 0.4 % — HIGH (ref 0.1–0.3)
INR BLD: 1.04 RATIO — SIGNIFICANT CHANGE UP (ref 0.65–1.3)
LYMPHOCYTES # BLD AUTO: 2.43 K/UL — SIGNIFICANT CHANGE UP (ref 1.2–3.4)
LYMPHOCYTES # BLD AUTO: 23.4 % — SIGNIFICANT CHANGE UP (ref 20.5–51.1)
MCHC RBC-ENTMCNC: 30.1 PG — SIGNIFICANT CHANGE UP (ref 27–31)
MCHC RBC-ENTMCNC: 32.8 G/DL — SIGNIFICANT CHANGE UP (ref 32–37)
MCV RBC AUTO: 91.7 FL — SIGNIFICANT CHANGE UP (ref 80–94)
MONOCYTES # BLD AUTO: 0.88 K/UL — HIGH (ref 0.1–0.6)
MONOCYTES NFR BLD AUTO: 8.5 % — SIGNIFICANT CHANGE UP (ref 1.7–9.3)
NEUTROPHILS # BLD AUTO: 6.67 K/UL — HIGH (ref 1.4–6.5)
NEUTROPHILS NFR BLD AUTO: 64.2 % — SIGNIFICANT CHANGE UP (ref 42.2–75.2)
NRBC # BLD: 0 /100 WBCS — SIGNIFICANT CHANGE UP (ref 0–0)
PLATELET # BLD AUTO: 265 K/UL — SIGNIFICANT CHANGE UP (ref 130–400)
POTASSIUM SERPL-MCNC: 3.7 MMOL/L — SIGNIFICANT CHANGE UP (ref 3.5–5)
POTASSIUM SERPL-SCNC: 3.7 MMOL/L — SIGNIFICANT CHANGE UP (ref 3.5–5)
PROT SERPL-MCNC: 6.4 G/DL — SIGNIFICANT CHANGE UP (ref 6–8)
PROTHROM AB SERPL-ACNC: 11.9 SEC — SIGNIFICANT CHANGE UP (ref 9.95–12.87)
RBC # BLD: 5.15 M/UL — SIGNIFICANT CHANGE UP (ref 4.7–6.1)
RBC # FLD: 13.3 % — SIGNIFICANT CHANGE UP (ref 11.5–14.5)
SODIUM SERPL-SCNC: 143 MMOL/L — SIGNIFICANT CHANGE UP (ref 135–146)
WBC # BLD: 10.38 K/UL — SIGNIFICANT CHANGE UP (ref 4.8–10.8)
WBC # FLD AUTO: 10.38 K/UL — SIGNIFICANT CHANGE UP (ref 4.8–10.8)

## 2022-11-09 PROCEDURE — 71046 X-RAY EXAM CHEST 2 VIEWS: CPT | Mod: 26

## 2022-11-09 PROCEDURE — 93010 ELECTROCARDIOGRAM REPORT: CPT

## 2022-11-09 NOTE — H&P PST ADULT - NSICDXPASTMEDICALHX_GEN_ALL_CORE_FT
PAST MEDICAL HISTORY:  Cigarette smoker within last 12 months current smoker    Hyperlipidemia     Hypertension pt stopped taking meds on his own    Obesity     MEÑO (obstructive sleep apnea) not using machine, machine was retrieved by insurance company

## 2022-11-15 PROBLEM — E78.5 HYPERLIPIDEMIA, UNSPECIFIED: Chronic | Status: ACTIVE | Noted: 2022-11-09

## 2022-11-15 PROBLEM — G47.33 OBSTRUCTIVE SLEEP APNEA (ADULT) (PEDIATRIC): Chronic | Status: ACTIVE | Noted: 2022-11-09

## 2022-11-15 PROBLEM — Z87.891 PERSONAL HISTORY OF NICOTINE DEPENDENCE: Chronic | Status: ACTIVE | Noted: 2022-11-09

## 2022-11-15 PROBLEM — E66.9 OBESITY, UNSPECIFIED: Chronic | Status: ACTIVE | Noted: 2022-11-09

## 2022-11-15 PROBLEM — I10 ESSENTIAL (PRIMARY) HYPERTENSION: Chronic | Status: ACTIVE | Noted: 2022-11-09

## 2022-11-23 ENCOUNTER — APPOINTMENT (OUTPATIENT)
Dept: CARDIOLOGY | Facility: CLINIC | Age: 62
End: 2022-11-23

## 2022-11-28 ENCOUNTER — RESULT CHARGE (OUTPATIENT)
Age: 62
End: 2022-11-28

## 2022-11-28 ENCOUNTER — LABORATORY RESULT (OUTPATIENT)
Age: 62
End: 2022-11-28

## 2022-11-28 ENCOUNTER — APPOINTMENT (OUTPATIENT)
Dept: CARDIOLOGY | Facility: CLINIC | Age: 62
End: 2022-11-28

## 2022-11-28 VITALS
HEART RATE: 82 BPM | BODY MASS INDEX: 30.12 KG/M2 | WEIGHT: 170 LBS | SYSTOLIC BLOOD PRESSURE: 130 MMHG | HEIGHT: 63 IN | DIASTOLIC BLOOD PRESSURE: 88 MMHG | TEMPERATURE: 97.8 F

## 2022-11-28 PROCEDURE — 93000 ELECTROCARDIOGRAM COMPLETE: CPT | Mod: NC

## 2022-11-28 PROCEDURE — 99214 OFFICE O/P EST MOD 30 MIN: CPT | Mod: 25

## 2022-11-28 RX ORDER — IBUPROFEN 600 MG/1
600 TABLET, FILM COATED ORAL
Qty: 50 | Refills: 0 | Status: ACTIVE | COMMUNITY
Start: 2022-11-21

## 2022-11-28 RX ORDER — MUPIROCIN 20 MG/G
2 OINTMENT TOPICAL
Qty: 22 | Refills: 0 | Status: ACTIVE | COMMUNITY
Start: 2022-07-07

## 2022-11-28 NOTE — HISTORY OF PRESENT ILLNESS
[Prior Anesthesia] : Prior anesthesia [Electrocardiogram] : ~T an ECG ~C was performed [Preoperative Visit] : for a medical evaluation prior to surgery [Scheduled Procedure ___] : a [unfilled] [Date of Surgery ___] : on [unfilled] [Stable] : Stable [Cardiovascular Disease] : cardiovascular disease [Echocardiogram] : ~T an echocardiogram ~C was performed [Good] : Good [Pulmonary Disease] : pulmonary disease [Fever] : no fever [Chills] : no chills [Fatigue] : no fatigue [Chest Pain] : no chest pain [Cough] : no cough [Dyspnea] : no dyspnea [Dysuria] : no dysuria [Urinary Frequency] : no urinary frequency [Nausea] : no nausea [Vomiting] : no vomiting [Diarrhea] : no diarrhea [Abdominal Pain] : no abdominal pain [Easy Bruising] : no easy bruising [Lower Extremity Swelling] : no lower extremity swelling [Poor Exercise Tolerance] : no poor exercise tolerance [Diabetes] : no diabetes [Anti-Platelet Agents] : no anti-platelet agents [Nicotine Dependence] : no nicotine dependence [Alcohol Use] : no  alcohol use [Renal Disease] : no renal disease [GI Disease] : no gastrointestinal disease [Sleep Apnea] : no sleep apnea [Thromboembolic Problems] : no thromboembolic problems [Clotting Disorder] : no clotting disorder [Frequent use of NSAIDs] : no use of NSAIDs [Bleeding Disorder] : no bleeding disorder [Transfusion Reaction] : no transfusion reaction [Impaired Immunity] : no impaired immunity [Steroid Use in Last 6 Months] : no steroid use in the last six months [Frequent Aspirin Use] : no frequent aspirin use [Prev Anesthesia Reaction] : no previous anesthesia reaction [FreeTextEntry1] : Mr. RIK HELMS is a 62 year old man with PMHx of HTN, HLD, HFmrEF, and smoker who is presenting for pre-op evaluation prior to MOHS surgery. He generally feels well and has no issues today. He has no chest pain or dyspnea at rest. He exercises regularly for his job and has minimal limitations. He does have mild dyspnea with significant exertion, but this is stable. \par \par \par TTE 12/16/2021\par Summary:\par 1.Left ventricular ejection fraction is estimated at 45-50%.\par 2.Mildly decreased global left ventricular systolic function.\par 3.Spectral Doppler shows impaired relaxation pattern of left ventricular myocardial filling \par (Grade I diastolic dysfunction).\par 4.Mildly calcified aortic valve without stenosis.\par 5.Trivial aortic regurgitation.\par

## 2022-11-28 NOTE — PHYSICAL EXAM
[General Appearance - Well Developed] : well developed [Normal Appearance] : normal appearance [Normal Conjunctiva] : the conjunctiva exhibited no abnormalities [Normal Oral Mucosa] : normal oral mucosa [Normal Jugular Venous V Waves Present] : normal jugular venous V waves present [] : no respiratory distress [Respiration, Rhythm And Depth] : normal respiratory rhythm and effort [Auscultation Breath Sounds / Voice Sounds] : lungs were clear to auscultation bilaterally [Heart Rate And Rhythm] : heart rate and rhythm were normal [Heart Sounds] : normal S1 and S2 [Murmurs] : no murmurs present [Arterial Pulses Normal] : the arterial pulses were normal [Edema] : no peripheral edema present [Abdomen Soft] : soft [Abnormal Walk] : normal gait [Nail Clubbing] : no clubbing of the fingernails [Skin Color & Pigmentation] : normal skin color and pigmentation [Oriented To Time, Place, And Person] : oriented to person, place, and time [Impaired Insight] : insight and judgment were intact

## 2022-11-28 NOTE — REVIEW OF SYSTEMS
[Fever] : no fever [Chills] : no chills [Blurry Vision] : no blurred vision [Earache] : no earache [Sore Throat] : no sore throat [SOB] : no shortness of breath [Dyspnea on exertion] : not dyspnea during exertion [Chest Discomfort] : no chest discomfort [Lower Ext Edema] : no extremity edema [Leg Claudication] : no intermittent leg claudication [Palpitations] : no palpitations [Orthopnea] : no orthopnea [PND] : no PND [Syncope] : no syncope [Cough] : no cough [Abdominal Pain] : no abdominal pain [Change in Appetite] : no change in appetite [Urinary Frequency] : no change in urinary frequency [Joint Pain] : no joint pain [Rash] : no rash [Dizziness] : no dizziness [Confusion] : no confusion was observed

## 2022-11-28 NOTE — ASSESSMENT
[FreeTextEntry1] : Mr. RIK DE LUNA is a 62 year old man with PMHx of HTN, HLD, HFmrEF, and smoker who is presenting for pre-op evaluation prior to MOHS surgery. \par \par Based on the RCRI, Mr De Luna is a moderate class III (2 point for CHF and presumed CAD, 10.1%) risk of major CV event from his upcoming surgical procedure. He has good functional capacity and thus does not require any further cardiac work up. He had a TTE in 12/2021, which demonstrates mildly reduced EF without PH or significant valvular abnormality. His BP is well controlled on amlodipine.\par \par He will need to follow up for further management of his HFmrEF and ASCVD risk. \par \par Atilio Reynolds MD\par Non-Invasive Cardiology\par [facility]\par [facility]\par [address]\par Office: [phone]\par

## 2022-11-29 NOTE — ASU PATIENT PROFILE, ADULT - FALL HARM RISK - UNIVERSAL INTERVENTIONS
Bed in lowest position, wheels locked, appropriate side rails in place/Call bell, personal items and telephone in reach/Instruct patient to call for assistance before getting out of bed or chair/Non-slip footwear when patient is out of bed/[place] to call system/Physically safe environment - no spills, clutter or unnecessary equipment/Purposeful Proactive Rounding/Room/bathroom lighting operational, light cord in reach/Unable to comprehend

## 2022-11-30 ENCOUNTER — APPOINTMENT (OUTPATIENT)
Dept: PLASTIC SURGERY | Facility: AMBULATORY SURGERY CENTER | Age: 62
End: 2022-11-30

## 2022-11-30 ENCOUNTER — OUTPATIENT (OUTPATIENT)
Dept: OUTPATIENT SERVICES | Facility: HOSPITAL | Age: 62
LOS: 1 days | Discharge: HOME | End: 2022-11-30

## 2022-11-30 ENCOUNTER — TRANSCRIPTION ENCOUNTER (OUTPATIENT)
Age: 62
End: 2022-11-30

## 2022-11-30 VITALS
HEIGHT: 63 IN | RESPIRATION RATE: 20 BRPM | SYSTOLIC BLOOD PRESSURE: 183 MMHG | HEART RATE: 63 BPM | WEIGHT: 171.96 LBS | OXYGEN SATURATION: 98 % | DIASTOLIC BLOOD PRESSURE: 94 MMHG | TEMPERATURE: 98 F

## 2022-11-30 VITALS
OXYGEN SATURATION: 93 % | HEART RATE: 76 BPM | DIASTOLIC BLOOD PRESSURE: 82 MMHG | RESPIRATION RATE: 12 BRPM | SYSTOLIC BLOOD PRESSURE: 146 MMHG

## 2022-11-30 PROCEDURE — 14020 TIS TRNFR S/A/L 10 SQ CM/<: CPT

## 2022-11-30 RX ORDER — SODIUM CHLORIDE 9 MG/ML
1000 INJECTION, SOLUTION INTRAVENOUS
Refills: 0 | Status: DISCONTINUED | OUTPATIENT
Start: 2022-11-30 | End: 2022-12-14

## 2022-11-30 RX ORDER — ACETAMINOPHEN 500 MG
650 TABLET ORAL ONCE
Refills: 0 | Status: DISCONTINUED | OUTPATIENT
Start: 2022-11-30 | End: 2022-12-14

## 2022-11-30 RX ORDER — ONDANSETRON 8 MG/1
4 TABLET, FILM COATED ORAL ONCE
Refills: 0 | Status: DISCONTINUED | OUTPATIENT
Start: 2022-11-30 | End: 2022-12-14

## 2022-11-30 RX ADMIN — SODIUM CHLORIDE 100 MILLILITER(S): 9 INJECTION, SOLUTION INTRAVENOUS at 15:29

## 2022-11-30 NOTE — ASU DISCHARGE PLAN (ADULT/PEDIATRIC) - NS MD DC FALL RISK RISK
For information on Fall & Injury Prevention, visit: [email or web address] OR  [email or web address] OR  https://www.cdc.gov/steadi/patient.html

## 2022-11-30 NOTE — PRE-ANESTHESIA EVALUATION ADULT - NSANTHOSAYNRD_GEN_A_CORE
[facility] Medicine Services  HISTORY AND PHYSICAL    Date of Admission: 12/10/2018  Primary Care Physician: Rene Hogue DO    Subjective     Chief Complaint: Abd pain.      History of Present Illness  Pt is 52 years old  male presented ER with complaint abdomen pain.  Patient been having intermittent right upper quadrant and epigastric pain off and on for one month.  Pain increased with food.  Patient was evaluated [facility] on Saturday and with a CT scan of his abdomen and pelvic-showing gallstones.  Patient has been very nausea, vomiting for last 3 days and progressing getting worse.  Patient also just finished course of Bactrim antibiotic and antiemetic, which did not help.  Patient complained of low-grade fever at home.  Patient denies any chills or night sweats symptoms.  Patient is morbidly obese.  Patient has recent stent placement on 11/6/18.  Patient's on Brilinta.  Patient has chronic history of coronary artery disease.  Patient has a strong history of coronary artery disease last 3 years with 5 stent placement total.  Ejection fraction was 30-35% last heart cath.    Patient presented to was in [facility] ER with slightly elevated liver function.  Right upper quadrant.  Laboratory shows worsening of acute renal injury with elevated BUN, and leukocytosis.  Ultrasound abdomen shows probable culprit.  Bladder polyp measuring 4 mm, no definite gallstone, there is mild gallbladder wall thickening,, duct measuring 6 mm slightly prominent for age, no intrahepatic duct dilation seen.     Patient has a history of diabetes, coronary artery disease with recent stent placement, diabetes.    Review of Systems   Constitutional: Positive for activity change, appetite change and fatigue. Negative for chills and fever.   HENT: Negative for hearing loss, nosebleeds, tinnitus and trouble swallowing.    Eyes: Negative for visual disturbance.   Respiratory: Negative  for cough, chest tightness, shortness of breath and wheezing.    Cardiovascular: Negative for chest pain, palpitations and leg swelling.   Gastrointestinal: Positive for abdominal pain, nausea and vomiting. Negative for abdominal distention, blood in stool, constipation and diarrhea.   Endocrine: Negative for cold intolerance, heat intolerance, polydipsia, polyphagia and polyuria.   Genitourinary: Negative for decreased urine volume, difficulty urinating, dysuria, flank pain, frequency and hematuria.   Musculoskeletal: Negative for arthralgias, joint swelling and myalgias.   Skin: Negative for rash.   Allergic/Immunologic: Negative for immunocompromised state.   Neurological: Positive for weakness. Negative for dizziness, syncope, light-headedness and headaches.   Hematological: Negative for adenopathy. Does not bruise/bleed easily.   Psychiatric/Behavioral: Negative for confusion and sleep disturbance. The patient is not nervous/anxious.         Otherwise complete ROS reviewed and negative except as mentioned in the HPI.      Past Medical History:   Past Medical History:   Diagnosis Date   • CAD (coronary artery disease)    • CAD in native artery     2V STEMI 7/13 STENT TO CX AND STENTX2 TO MID AND DISTAL LAD   • Diabetes mellitus (CMS/HCC)    • History of coronary artery stent placement      x 3 - for ACUTE MI 7/2013   • Hyperlipidemia    • Hyperlipidemia, mild    • Myocardial infarct (CMS/HCC)    • Myocardial infarction (CMS/HCC)    • Stented coronary artery        Past Surgical History:  Past Surgical History:   Procedure Laterality Date   • CAROTID STENT         Family History: family history includes Heart disease in his maternal grandmother; No Known Problems in his father and mother.    Social History:  reports that  has never smoked. he has never used smokeless tobacco. He reports that he does not drink alcohol or use drugs.    Medications:  Prior to Admission medications    Medication Sig Start Date End  Date Taking? Authorizing Provider   sulfamethoxazole-trimethoprim (BACTRIM,SEPTRA) 400-80 MG tablet Take 1 tablet by mouth 2 (Two) Times a Day.   Yes Neli Flores MD   aspirin 81 MG EC tablet Take 81 mg by mouth Daily.    Neli Flores MD   atorvastatin (LIPITOR) 80 MG tablet Take 1 tablet by mouth Every Night. 11/26/18   Bernarda Henriquez PA-C   Dulaglutide (TRULICITY) 0.75 MG/0.5ML solution pen-injector Inject 1 pen under the skin into the appropriate area as directed 1 (One) Time Per Week.    Neli Flores MD   DULoxetine (CYMBALTA) 20 MG capsule Take 20 mg by mouth Daily.    Neli Flores MD   HYDROcodone-acetaminophen (NORCO) 5-325 MG per tablet Take 1 tablet by mouth Every 4 (Four) Hours As Needed for Mild Pain  for up to 20 doses. 12/10/18   Bud Garcia MD   insulin aspart (novoLOG FLEXPEN) 100 UNIT/ML solution pen-injector sc pen Inject 18 Units under the skin into the appropriate area as directed 3 (Three) Times a Day With Meals.    Neli Flores MD   insulin aspart (novoLOG FLEXPEN) 100 UNIT/ML solution pen-injector sc pen Inject 2-7 Units under the skin into the appropriate area as directed As Needed.    Neli Flores MD   insulin degludec (TRESIBA FLEXTOUCH) 100 UNIT/ML solution pen-injector injection Inject 42 Units under the skin into the appropriate area as directed Every Morning.    eNli Flores MD   levocetirizine (XYZAL) 5 MG tablet Take 5 mg by mouth Every Evening.    Neli Flores MD   levoFLOXacin (LEVAQUIN) 500 MG tablet Take 1 tablet by mouth Daily for 5 days. 12/10/18 12/15/18  Bud Garcia MD   lisinopril (PRINIVIL,ZESTRIL) 10 MG tablet Take 1 tablet by mouth Daily. 11/26/18   Bernarda Henriquez PA-C   metFORMIN (GLUCOPHAGE) 500 MG tablet Take 1 tablet by mouth 2 (Two) Times a Day With Meals. HOLD FOR 48 HOURS POST CATH 11/7/18   Knees, Laura E, APRN   metoprolol succinate XL (TOPROL-XL) 50 MG 24 hr tablet Take 1 tablet by  "mouth Daily. 11/26/18   Bernarda Henriquez PA-C   nitroglycerin (NITROSTAT) 0.4 MG SL tablet Place 1 tablet under the tongue Every 5 (Five) Minutes As Needed for Chest Pain. Take no more than 3 doses in 15 minutes. 11/7/18   Patricia, Laura FOSTER APRN   spironolactone (ALDACTONE) 25 MG tablet Take 1 tablet by mouth Daily. 11/26/18   Bernarda Henriquez PA-C   ticagrelor (BRILINTA) 90 MG tablet tablet Take 1 tablet by mouth 2 (Two) Times a Day. 11/26/18   Bernarda Henriquez PA-C   timolol (TIMOPTIC) 0.5 % ophthalmic solution Administer 1 drop to both eyes Daily.    Provider, MD Neli   ZOFRAN 8 MG tablet Take 1 tablet by mouth Every 8 (Eight) Hours As Needed for Nausea or Vomiting for up to 10 doses. 12/10/18   Bud Garcia MD     Allergies:  No Known Allergies    Objective     Vital Signs: BP 93/58   Pulse 79   Temp 97.3 °F (36.3 °C)   Resp 14   Ht 175.3 cm (69\")   Wt 97 kg (213 lb 12.8 oz)   SpO2 97%   BMI 31.57 kg/m²   Physical Exam   Constitutional: He is oriented to person, place, and time. He appears well-developed and well-nourished.   HENT:   Head: Normocephalic and atraumatic.   Eyes: Conjunctivae and EOM are normal. Pupils are equal, round, and reactive to light.   Neck: Neck supple. No JVD present. No thyromegaly present.   Cardiovascular: Normal rate, regular rhythm, normal heart sounds and intact distal pulses. Exam reveals no gallop and no friction rub.   No murmur heard.  Pulmonary/Chest: Effort normal and breath sounds normal. No respiratory distress. He has no wheezes. He has no rales. He exhibits no tenderness.   Abdominal: Soft. Bowel sounds are normal. He exhibits no distension. There is tenderness ( epigastric/right upper quadrant pain). There is guarding. There is no rebound.   Musculoskeletal: Normal range of motion. He exhibits no edema, tenderness or deformity.   Lymphadenopathy:     He has no cervical adenopathy.   Neurological: He is alert and oriented to person, place, and time. He " displays normal reflexes. No cranial nerve deficit. He exhibits normal muscle tone.   Skin: Skin is warm and dry. No rash noted.   Psychiatric: He has a normal mood and affect. His behavior is normal. Judgment and thought content normal.   Nursing note and vitals reviewed.          Results Reviewed:    Lab Results (last 24 hours)     Procedure Component Value Units Date/Time    Lactic Acid, Reflex Timer (This will reflex a repeat order 3-3:15 hours after ordered.) [[id number]] Collected:  12/10/18 1138    Specimen:  Blood Updated:  12/10/18 1546     Extra Tube Hold for add-ons.     Comment: Auto resulted.       Blood Culture With POOL - Blood, Arm, Right [[id number]] Collected:  12/10/18 1135    Specimen:  Blood from Arm, Right Updated:  12/10/18 1239    Lactic Acid, Plasma [[id number]]  (Abnormal) Collected:  12/10/18 1138    Specimen:  Blood Updated:  12/10/18 1232     Lactate 2.8 mmol/L     Blood Culture With POOL - Blood, Arm, Right [[id number]] Collected:  12/10/18 1145    Specimen:  Blood from Arm, Right Updated:  12/10/18 1226    Urinalysis With Microscopic If Indicated (No Culture) - Urine, Clean Catch [[id number]]  (Abnormal) Collected:  12/10/18 1203    Specimen:  Urine, Clean Catch Updated:  12/10/18 1224     Color, UA Yellow     Appearance, UA Clear     pH, UA 5.5     Specific Gravity, UA >=1.030     Glucose,  mg/dL (1+)     Ketones, UA 15 mg/dL (1+)     Bilirubin, UA Small (1+)     Blood, UA Negative     Protein, UA Trace     Leuk Esterase, UA Negative     Nitrite, UA Negative     Urobilinogen, UA 0.2 E.U./dL    Narrative:       Urine microscopic not indicated.    Lipase [[id number]]  (Normal) Collected:  12/10/18 1138    Specimen:  Blood Updated:  12/10/18 1222     Lipase 44 U/L     Comprehensive Metabolic Panel [[id number]]  (Abnormal) Collected:  12/10/18 1138    Specimen:  Blood Updated:  12/10/18 1222     Glucose 149 mg/dL      BUN 36 mg/dL      Creatinine 2.44 mg/dL      Sodium 140 mmol/L       Potassium 4.1 mmol/L      Chloride 94 mmol/L      CO2 29.0 mmol/L      Calcium 9.3 mg/dL      Total Protein 7.9 g/dL      Albumin 4.40 g/dL      ALT (SGPT) 24 U/L      AST (SGOT) 42 U/L      Alkaline Phosphatase 109 U/L      Total Bilirubin 0.9 mg/dL      eGFR Non African Amer 28 mL/min/1.73      Globulin 3.5 gm/dL      A/G Ratio 1.3 g/dL      BUN/Creatinine Ratio 14.8     Anion Gap 17.0 mmol/L     Protime-INR [[id number]]  (Normal) Collected:  12/10/18 1138    Specimen:  Blood Updated:  12/10/18 1215     Protime 13.5 Seconds      INR 1.00    aPTT [[id number]]  (Normal) Collected:  12/10/18 1138    Specimen:  Blood Updated:  12/10/18 1215     PTT 28.9 seconds     CBC & Differential [[id number]] Collected:  12/10/18 1138    Specimen:  Blood Updated:  12/10/18 1207    Narrative:       The following orders were created for panel order CBC & Differential.  Procedure                               Abnormality         Status                     ---------                               -----------         ------                     CBC Auto Differential[[id number]]        Abnormal            Final result                 Please view results for these tests on the individual orders.    CBC Auto Differential [[id number]]  (Abnormal) Collected:  12/10/18 1138    Specimen:  Blood Updated:  12/10/18 1207     WBC 13.98 10*3/mm3      RBC 4.44 10*6/mm3      Hemoglobin 13.3 g/dL      Hematocrit 39.6 %      MCV 89.2 fL      MCH 30.0 pg      MCHC 33.6 g/dL      RDW 13.1 %      RDW-SD 42.6 fl      MPV 9.5 fL      Platelets 324 10*3/mm3      Neutrophil % 85.6 %      Lymphocyte % 6.7 %      Monocyte % 6.4 %      Eosinophil % 0.4 %      Basophil % 0.3 %      Immature Grans % 0.6 %      Neutrophils, Absolute 11.97 10*3/mm3      Lymphocytes, Absolute 0.93 10*3/mm3      Monocytes, Absolute 0.89 10*3/mm3      Eosinophils, Absolute 0.06 10*3/mm3      Basophils, Absolute 0.04 10*3/mm3      Immature Grans, Absolute 0.09 10*3/mm3      nRBC 0.0 /100 WBC      POC Glucose Once [[id number]]  (Abnormal) Collected:  12/10/18 1118    Specimen:  Blood Updated:  12/10/18 1129     Glucose 146 mg/dL      Comment: : [id number] Brennan ID: [id number]              Radiology Data:    Imaging Results (last 24 hours)     Procedure Component Value Units Date/Time    US Abdomen Limited [[id number]] Collected:  12/10/18 1429     Updated:  12/10/18 1435    Narrative:       EXAMINATION:  US ABDOMEN LIMITED-  12/10/2018 1:30 PM CST     HISTORY: RUQ pain. Concern for acute cholecystitis.     COMPARISON: No comparison study.     TECHNIQUE: Multiple sonographic images were obtained.     FINDINGS: The liver echogenicity is normal. There is normal direction of  portal vein blood flow. No definite gallstones are appreciated. There is  a nonmoving 4 mm probable gallbladder polyp. The gallbladder wall is  mildly thickened measuring 3.4 mm. The right kidney measures 12.1 cm.  The cortical thickness and echogenicity are normal. The common bile duct  measures 6 mm.       Impression:       1. Probable gallbladder polyp measuring 4 mm. No definite gallstones.  There is mild gallbladder wall thickening.  2. The common duct measures 6 mm which is slightly prominent for age. No  intrahepatic ductal dilatation is seen.  This report was finalized on 12/10/2018 14:31 by Dr. Cristofer Isaac MD.    XR Chest 1 View [[id number]] Collected:  12/10/18 1255     Updated:  12/10/18 1259    Narrative:       EXAMINATION:  XR CHEST 1 VW-  12/10/2018 12:28 PM CST     HISTORY: Hypotension. There is concern for sepsis and infection.     COMPARISON: 7/22/2013.     FINDINGS:  There is no dense infiltrate or effusion. There is mild  bronchial wall thickening, stable. The heart is normal in size.       Impression:       1. No focal infiltrate or effusion.  2. Mild bronchial wall thickening, stable.        This report was finalized on 12/10/2018 12:56 by Dr. Cristofer Isaac MD.          I have personally reviewed  and interpreted the radiology studies and ECG obtained at time of admission.     Assessment / Plan      Assessment & Plan  Active Hospital Problems    Diagnosis   • RUQ pain   • Dehydration   • CAD (coronary artery disease)   • Carotid stent occlusion (CMS/HCC)   • Diabetes (CMS/HCC)     Plans    Right upper quadrant pain. Patient presented to was in [facility] ER with slightly elevated liver function.  Right upper quadrant.  Laboratory shows worsening of acute renal injury with elevated BUN, and leukocytosis.  Ultrasound abdomen shows probable culprit.  Bladder polyp measuring 4 mm, no definite gallstone, there is mild gallbladder wall thickening, duct measuring 6 mm slightly prominent for age, no intrahepatic duct dilation seen.  1 L of elective and was given in ER.   2 g of Rocephin was given in ER.  Unable to to do surgical intervention because risks of bleeding on Brilinta.  Recommendation surgical intervention at this time.  Patient to begin Rocephin antibiotic for now.  Patient will to go home with Levaquin antibiotics for another 7 days.  Follow his primary care doctor until he is off Brilinta.  Then we can look forward intermediate cholecystectomy.  This is the recommendation from general surgery.    CAD.  Status post right circumflex artery stent placement on 11/6/18.  Ejection fraction 30-35%. .  Continue aspirin and Brilinta.     Acute kidney injury/dehydration.  Slow IV hydration due to CHF.    Nausea/vomit- Phenergan, Zofran, Pepcid.    Diabetes.  Sliding scale.  Hemoglobin A1c.  Lipid profile.  TSH.      Code Status: full code     I discussed the patient's findings and my recommendations with: patient    Estimated length of stay: 2-4 days.     Aston Webb MD   12/10/18   4:03 PM             Yes

## 2022-11-30 NOTE — ASU DISCHARGE PLAN (ADULT/PEDIATRIC) - ASU DC SPECIAL INSTRUCTIONSFT
Please follow up with Dr. Yañez office for your post op appointment scheduled for 12/07/2022 at 10:30am. Please call his office at the number provided to schedule this appointment. Please call within 48 hours of leaving the hospital.    You can shower, however Please keep all of your bandages clean and dry until we see you at your post-op visit     Pain: Please take Extra Strength Tylenol every 6 hours or as needed for discomfort     Please sleep with your head elevated to assist with minimizing swelling. It is ok to use a ice pack on the area intermittently for the next 2-3 days.

## 2022-11-30 NOTE — ASU DISCHARGE PLAN (ADULT/PEDIATRIC) - CARE PROVIDER_API CALL
Ronal Reyes)  Plastic Surgery; Surgery of the Hand  [address] [place] [address], [address]  [place] [address]  Phone: [phone]  Fax: [phone]  Scheduled Appointment: 12/07/2022 10:30 AM

## 2022-12-02 DIAGNOSIS — C44.91 BASAL CELL CARCINOMA OF SKIN, UNSPECIFIED: ICD-10-CM

## 2022-12-02 DIAGNOSIS — E66.9 OBESITY, UNSPECIFIED: ICD-10-CM

## 2022-12-02 DIAGNOSIS — I10 ESSENTIAL (PRIMARY) HYPERTENSION: ICD-10-CM

## 2022-12-02 DIAGNOSIS — G47.33 OBSTRUCTIVE SLEEP APNEA (ADULT) (PEDIATRIC): ICD-10-CM

## 2022-12-02 DIAGNOSIS — E78.00 PURE HYPERCHOLESTEROLEMIA, UNSPECIFIED: ICD-10-CM

## 2022-12-07 ENCOUNTER — APPOINTMENT (OUTPATIENT)
Dept: PLASTIC SURGERY | Facility: CLINIC | Age: 62
End: 2022-12-07

## 2022-12-07 PROCEDURE — 99024 POSTOP FOLLOW-UP VISIT: CPT

## 2022-12-07 NOTE — ASSESSMENT
[FreeTextEntry1] : Pt is POD# 7 s/p scalp wound closure with local flap. Doing well \par \par - Keep sutures\par - All bandages changed. Baci, telfa & teg. Leave on until next appt \par - signs and symptoms of infection reviewed\par - No heavy lifting/pushing/pulling. Pt is a hernandez, reinforced importance of this\par - Re-emphasized importance of nicotine cessation (pt had episode of desat during surgery where LMA changed to intubation, pt aware)\par - All post op instructions reviewed, all questions answered\par - f/u 1 week for suture removal \par \par \par \par Due to COVID 19, pre-visit patient instructions were explained to the patient and their symptoms were checked upon arrival.  \par Masks were used by the health care providers and staff and the examination room was cleaned after the patient visit was completed.\par \par

## 2022-12-07 NOTE — PHYSICAL EXAM
[NI] : Normal [de-identified] : left scalp incision is CDI with no seroma/hematoma/cellulitis noted. Mild swelling. no open areas or drainage. Sutures in place [de-identified] : BABATUNDEs [de-identified] : Supple [de-identified] : Audible wheezing  [de-identified] : neg homans b/l

## 2022-12-07 NOTE — HISTORY OF PRESENT ILLNESS
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 premalignant lesion left posterior neck by Amadoushivani--pt and wife do nto recall specifically\par \par Interval hx (12/7/22): Pt is POD# 7 s/p scalp wound closure with local flap. Doing well. Denies f ever/chills/cp/n/v. With chronic RUSSELL due to smoking hx. No issues with wound, denies drainage.

## 2022-12-07 NOTE — REVIEW OF SYSTEMS
[Fever] : no fever [Chills] : no chills [Chest Pain] : no chest pain [Lower Ext Edema] : no extremity edema [As Noted in HPI] : as noted in HPI

## 2022-12-14 ENCOUNTER — APPOINTMENT (OUTPATIENT)
Dept: PLASTIC SURGERY | Facility: CLINIC | Age: 62
End: 2022-12-14

## 2022-12-14 DIAGNOSIS — Z98.890 OTHER ACQUIRED DEFORMITY OF HEAD: ICD-10-CM

## 2022-12-14 DIAGNOSIS — M95.2 OTHER ACQUIRED DEFORMITY OF HEAD: ICD-10-CM

## 2022-12-14 PROCEDURE — 99024 POSTOP FOLLOW-UP VISIT: CPT

## 2022-12-14 NOTE — ASSESSMENT
[FreeTextEntry1] : 61 yo M with SCC of scalp s/p Moh's now POD# 7 s/p scalp wound closure with local flap. Doing well.\par \par - Sutures removed, steri strips applied\par - May shower\par - Daily Aquaphor \par - No heavy lifting/pushing/pulling. \par - Re-emphasized importance of nicotine cessation (pt had episode of desat during surgery where LMA changed to intubation, pt aware)\par - All post op instructions reviewed, all questions answered\par - f/u 6 weeks with Dr. Reyes. \par \par Due to COVID 19, pre-visit patient instructions were explained to the patient and their symptoms were checked upon arrival.  \par Masks were used by the health care providers and staff and the examination room was cleaned after the patient visit was completed.\par \par

## 2022-12-14 NOTE — HISTORY OF PRESENT ILLNESS
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 premalignant lesion left posterior neck by James--pt and wife do not recall specifically\par \par Interval hx (12/7/22): Pt is POD# 7 s/p scalp wound closure with local flap. Doing well. Denies fever/chills/cp/n/v. With chronic RUSSELL due to smoking hx. No issues with wound, denies drainage. \par \par Interval hx (12/14/22): Pt presents today POD# 14 s/p Moh's scalp wound closure with local flap. Doing well with no significant pain, f/c or bleeding. Happy with results.

## 2022-12-14 NOTE — PHYSICAL EXAM
[NI] : Normal [de-identified] : NAD [de-identified] : frontal scalp incision is healing well, CDI with no seroma/hematoma/cellulitis noted. Mild swelling. Excellent overall contour and cosmesis.

## 2022-12-29 ENCOUNTER — APPOINTMENT (OUTPATIENT)
Dept: CARDIOLOGY | Facility: CLINIC | Age: 62
End: 2022-12-29

## 2023-01-01 ENCOUNTER — APPOINTMENT (OUTPATIENT)
Dept: CARDIOLOGY | Facility: CLINIC | Age: 63
End: 2023-01-01

## 2023-01-23 ENCOUNTER — APPOINTMENT (OUTPATIENT)
Dept: CARDIOLOGY | Facility: CLINIC | Age: 63
End: 2023-01-23
Payer: COMMERCIAL

## 2023-01-23 VITALS
DIASTOLIC BLOOD PRESSURE: 94 MMHG | HEART RATE: 63 BPM | SYSTOLIC BLOOD PRESSURE: 158 MMHG | BODY MASS INDEX: 29.59 KG/M2 | HEIGHT: 63 IN | WEIGHT: 167 LBS | TEMPERATURE: 98.1 F

## 2023-01-23 DIAGNOSIS — M79.604 PAIN IN RIGHT LEG: ICD-10-CM

## 2023-01-23 PROCEDURE — 99406 BEHAV CHNG SMOKING 3-10 MIN: CPT

## 2023-01-23 PROCEDURE — 99214 OFFICE O/P EST MOD 30 MIN: CPT | Mod: 25

## 2023-01-23 PROCEDURE — 93000 ELECTROCARDIOGRAM COMPLETE: CPT

## 2023-01-23 RX ORDER — AMLODIPINE BESYLATE 10 MG/1
10 TABLET ORAL
Refills: 0 | Status: DISCONTINUED | COMMUNITY
End: 2023-01-23

## 2023-01-23 RX ORDER — FAMOTIDINE 20 MG/1
20 TABLET, FILM COATED ORAL AS DIRECTED
Refills: 0 | Status: ACTIVE | COMMUNITY

## 2023-01-23 NOTE — PHYSICAL EXAM
[Well Developed] : well developed [Well Nourished] : well nourished [No Acute Distress] : no acute distress [Normal Conjunctiva] : normal conjunctiva [Normal Venous Pressure] : normal venous pressure [No Carotid Bruit] : no carotid bruit [No Murmur] : no murmur [Normal S1, S2] : normal S1, S2 [No Rub] : no rub [No Gallop] : no gallop [Clear Lung Fields] : clear lung fields [Good Air Entry] : good air entry [No Respiratory Distress] : no respiratory distress  [Soft] : abdomen soft [Normal Gait] : normal gait [No Edema] : no edema [No Rash] : no rash [No Skin Lesions] : no skin lesions [Moves all extremities] : moves all extremities [No Focal Deficits] : no focal deficits [Normal Speech] : normal speech [Alert and Oriented] : alert and oriented [Normal memory] : normal memory [Normal Radial B/L] : normal radial B/L [Diminished Pedal Pulses ___] : diminished pedal pulses [unfilled]

## 2023-01-23 NOTE — REVIEW OF SYSTEMS
[Negative] : Heme/Lymph [SOB] : no shortness of breath [Dyspnea on exertion] : not dyspnea during exertion [Chest Discomfort] : no chest discomfort [Lower Ext Edema] : no extremity edema [Leg Claudication] : intermittent leg claudication [Palpitations] : no palpitations [Orthopnea] : no orthopnea [PND] : no PND [Syncope] : no syncope

## 2023-01-23 NOTE — REASON FOR VISIT
[Cardiac Failure] : cardiac failure [Hypertension] : hypertension [FreeTextEntry1] : Mr. RIK HELMS is a 62 year old man with PMHx of HTN, HLD, HFmrEF, and smoker who is presenting for follow up. He was last seen for pre-op evaluation for MOHS, which was uncomplicated. He tells me that he has had RLE pain with exertion, after ~1 mile or so. It is in his calf. His LLE is not involved as much. He has no chest pain or SOB. He continues to smoke, but is trying to cut down. \par \par TTE 12/16/2021\par Summary:\par 1.Left ventricular ejection fraction is estimated at 45-50%.\par 2.Mildly decreased global left ventricular systolic function.\par 3.Spectral Doppler shows impaired relaxation pattern of left ventricular myocardial filling \par (Grade I diastolic dysfunction).\par 4.Mildly calcified aortic valve without stenosis.\par 5.Trivial aortic regurgitation.

## 2023-01-23 NOTE — ASSESSMENT
[FreeTextEntry1] : Mr. RIK HELMS is a 62 year old man with PMHx of HTN, HLD, HFmrEF, and smoker who is presenting for follow up. \par \par -Reviewed TTE\par -Repeat TTE to ensure no worsening of LVEF\par -Order CCTA to rule out ischemia and evaluate for CAD given new diagnosis of CHF. Order metoprolol tartrate 50 (2 tabs) to take the night before and morning of the CCTA. He is not a treadmill candidate due to RLE pain. Discussed LHC with patient, but he would like to defer invasive test until necessary.\par -Order SANIYA to evaluate for PAD. If abnormal, will refer to vascular Dr Cazares.\par -Stop amlodipine and start entresto 24-26 BID for HFmrEF\par -Start jardiance 10 daily for HFmrEF\par \par RTC in 4 weeks for BP check. Will need to check BMP after initation of entresto\par \par Atilio Reynolds MD\par Non-Invasive Cardiology\par [facility]\par [facility]\par [address] [place] [address] [address]\par Office: [phone]\par \par \par

## 2023-01-31 ENCOUNTER — APPOINTMENT (OUTPATIENT)
Dept: PLASTIC SURGERY | Facility: CLINIC | Age: 63
End: 2023-01-31
Payer: COMMERCIAL

## 2023-01-31 PROCEDURE — 99024 POSTOP FOLLOW-UP VISIT: CPT

## 2023-01-31 NOTE — PHYSICAL EXAM
[NI] : Normal [de-identified] : NAD [de-identified] : frontal scalp incision is healing well, CDI with no seroma/hematoma/cellulitis noted. Mild swelling. Excellent overall contour and cosmesis.

## 2023-01-31 NOTE — ASSESSMENT
[FreeTextEntry1] : 61 yo M with SCC of scalp s/p Moh's now POD# 7 s/p scalp wound closure with local flap. Doing well.\par \par - Sutures removed, steri strips applied\par - May shower\par - Daily Aquaphor \par - No heavy lifting/pushing/pulling. \par - Re-emphasized importance of nicotine cessation (pt had episode of desat during surgery where LMA changed to intubation, pt aware)\par - All post op instructions reviewed, all questions answered\par - f/u 6 weeks with Dr. Reyes. \par \par Due to COVID 19, pre-visit patient instructions were explained to the patient and their symptoms were checked upon arrival.  \par Masks were used by the health care providers and staff and the examination room was cleaned after the patient visit was completed.\par \par 1/31/2023\par postop check\par wound fine--small scab centrally\par likely resolving suture reaction\par \par to see Derm (Ligresti)\par \par f/u w me in 3 months\par \par Due to COVID 19, pre-visit patient instructions were explained to the patient and their symptoms were checked upon arrival.  \par Masks were used by the health care providers and staff and the examination room was cleaned after the patient visit was completed.\par

## 2023-02-17 ENCOUNTER — APPOINTMENT (OUTPATIENT)
Dept: CARDIOLOGY | Facility: CLINIC | Age: 63
End: 2023-02-17

## 2023-03-21 ENCOUNTER — APPOINTMENT (OUTPATIENT)
Dept: CARDIOLOGY | Facility: CLINIC | Age: 63
End: 2023-03-21

## 2023-03-29 ENCOUNTER — APPOINTMENT (OUTPATIENT)
Dept: CARDIOLOGY | Facility: CLINIC | Age: 63
End: 2023-03-29

## 2023-04-06 ENCOUNTER — APPOINTMENT (OUTPATIENT)
Dept: CARDIOLOGY | Facility: CLINIC | Age: 63
End: 2023-04-06
Payer: COMMERCIAL

## 2023-04-06 PROCEDURE — 93306 TTE W/DOPPLER COMPLETE: CPT

## 2023-04-11 RX ORDER — SACUBITRIL AND VALSARTAN 24; 26 MG/1; MG/1
24-26 TABLET, FILM COATED ORAL TWICE DAILY
Qty: 60 | Refills: 5 | Status: DISCONTINUED | COMMUNITY
Start: 2023-01-23 | End: 2023-04-11

## 2023-04-12 ENCOUNTER — RESULT CHARGE (OUTPATIENT)
Age: 63
End: 2023-04-12

## 2023-04-12 ENCOUNTER — APPOINTMENT (OUTPATIENT)
Dept: CARDIOLOGY | Facility: CLINIC | Age: 63
End: 2023-04-12
Payer: COMMERCIAL

## 2023-04-12 VITALS
BODY MASS INDEX: 30.12 KG/M2 | TEMPERATURE: 98.8 F | DIASTOLIC BLOOD PRESSURE: 98 MMHG | SYSTOLIC BLOOD PRESSURE: 132 MMHG | HEIGHT: 63 IN | WEIGHT: 170 LBS

## 2023-04-12 VITALS — HEART RATE: 68 BPM

## 2023-04-12 DIAGNOSIS — F17.200 NICOTINE DEPENDENCE, UNSPECIFIED, UNCOMPLICATED: ICD-10-CM

## 2023-04-12 DIAGNOSIS — Z01.810 ENCOUNTER FOR PREPROCEDURAL CARDIOVASCULAR EXAMINATION: ICD-10-CM

## 2023-04-12 PROCEDURE — 99214 OFFICE O/P EST MOD 30 MIN: CPT | Mod: 25

## 2023-04-12 PROCEDURE — 99406 BEHAV CHNG SMOKING 3-10 MIN: CPT

## 2023-04-12 PROCEDURE — 93000 ELECTROCARDIOGRAM COMPLETE: CPT

## 2023-04-12 RX ORDER — EMPAGLIFLOZIN 10 MG/1
10 TABLET, FILM COATED ORAL
Qty: 30 | Refills: 5 | Status: DISCONTINUED | COMMUNITY
Start: 2023-01-23 | End: 2023-04-12

## 2023-04-12 RX ORDER — LOSARTAN POTASSIUM 25 MG/1
25 TABLET, FILM COATED ORAL DAILY
Qty: 90 | Refills: 3 | Status: DISCONTINUED | COMMUNITY
Start: 2023-04-11 | End: 2023-04-12

## 2023-04-12 NOTE — ASSESSMENT
[FreeTextEntry1] : Mr. RIK HELMS is a 63 year old man with PMHx of HTN, HLD, HFmrEF, mild-mod AR, and smoker who is presenting for follow up.\par \par -Reviewed previous TTEs, ECG\par -Order CCTA to rule out ischemia and evaluate for CAD given reduced EF. Order metoprolol tartrate 50 (2 tabs) to take the night before and morning of the CCTA.\par -Continue losartan 25 daily for HFmrEF and HTN\par -Continue jardiance 10 daily for HFmrEF\par -Discussed the risks of continued smoking and the cardiovascular benefits of quitting. Encouraged complete smoking cessation.\par -Encouraged improved lifestyle modifications with these recommendations below:\par -Plant-based and Mediterranean diets, along with increased fruit, nut, vegetable, legume, and lean vegetable or animal protein (preferably fish) consumption\par -Engage in at least 150 minutes per week of accumulated moderate-intensity aerobic physical activity or 75 minutes per week of vigorous-intensity aerobic physical activity\par \par Atilio Reynolds MD, FACC\par Non-Invasive Cardiology\par [facility]\par [facility]\par [address]\par Office: [phone]\par \par \par

## 2023-04-12 NOTE — REVIEW OF SYSTEMS
[Negative] : Heme/Lymph [SOB] : shortness of breath [Dyspnea on exertion] : dyspnea during exertion [Chest Discomfort] : no chest discomfort [Lower Ext Edema] : no extremity edema [Leg Claudication] : no intermittent leg claudication [Palpitations] : no palpitations [Orthopnea] : no orthopnea [PND] : no PND [Syncope] : no syncope

## 2023-04-12 NOTE — COUNSELING
[Yes] : Risk of tobacco use and health benefits of smoking cessation discussed: Yes [Cessation strategies including cessation program discussed] : Cessation strategies including cessation program discussed [Use of nicotine replacement therapies and other medications discussed] : Use of nicotine replacement therapies and other medications discussed [Encouraged to pick a quit date and identify support needed to quit] : Encouraged to pick a quit date and identify support needed to quit [No] : Not willing to quit smoking [FreeTextEntry1] : 5

## 2023-04-12 NOTE — REASON FOR VISIT
[Symptom and Test Evaluation] : symptom and test evaluation [Cardiac Failure] : cardiac failure [FreeTextEntry1] : Mr. RIK HELMS is a 63 year old man with PMHx of HTN, HLD, HFmrEF, mild-mod AR, and smoker who is presenting for follow up. He recently underwent TTE, which demonstrated recovered EF. He continues to have occasional dyspnea with exertion. He has no chest pain.\par \par TTE 4/6/2023\par CONCLUSIONS:\par 1.The left ventricular systolic function is low-normal with an ejection fraction of 53 %.\par 2.Mild left ventricular hypertrophy.\par 3.Entire lateral wall is hypokinetic.\par 4.Normal right ventricular cavity size and normal systolic function.\par 5.Mild-to-moderate aortic regurgitation.\par 6.Pulmonary artery systolic pressure could not be estimated.\par 7.The proximal ascending aorta is mildly dilated at 3.7cm (inedxed 2.07 cm/m^2).\par 8.Compared to the transthoracic echocardiogram performed on 12/16/2021 the LVEF has increased\par

## 2023-04-12 NOTE — PHYSICAL EXAM
[Well Developed] : well developed [Well Nourished] : well nourished [No Acute Distress] : no acute distress [Normal Conjunctiva] : normal conjunctiva [Normal Venous Pressure] : normal venous pressure [No Carotid Bruit] : no carotid bruit [Normal S1, S2] : normal S1, S2 [No Murmur] : no murmur [No Rub] : no rub [No Gallop] : no gallop [Clear Lung Fields] : clear lung fields [Good Air Entry] : good air entry [No Respiratory Distress] : no respiratory distress  [Soft] : abdomen soft [Normal Gait] : normal gait [No Edema] : no edema [Normal Radial B/L] : normal radial B/L [Diminished Pedal Pulses ___] : diminished pedal pulses [unfilled] [No Rash] : no rash [No Skin Lesions] : no skin lesions [Moves all extremities] : moves all extremities [No Focal Deficits] : no focal deficits [Normal Speech] : normal speech [Alert and Oriented] : alert and oriented [Normal memory] : normal memory

## 2023-04-21 ENCOUNTER — APPOINTMENT (OUTPATIENT)
Dept: CARDIOLOGY | Facility: CLINIC | Age: 63
End: 2023-04-21

## 2023-04-25 ENCOUNTER — APPOINTMENT (OUTPATIENT)
Dept: PLASTIC SURGERY | Facility: CLINIC | Age: 63
End: 2023-04-25
Payer: COMMERCIAL

## 2023-04-25 PROCEDURE — 99212 OFFICE O/P EST SF 10 MIN: CPT

## 2023-04-25 NOTE — ASSESSMENT
[FreeTextEntry1] : 61 yo M with SCC of scalp s/p Moh's now POD# 7 s/p scalp wound closure with local flap. Doing well.\par \par - Sutures removed, steri strips applied\par - May shower\par - Daily Aquaphor \par - No heavy lifting/pushing/pulling. \par - Re-emphasized importance of nicotine cessation (pt had episode of desat during surgery where LMA changed to intubation, pt aware)\par - All post op instructions reviewed, all questions answered\par - f/u 6 weeks with Dr. Reyes. \par \par Due to COVID 19, pre-visit patient instructions were explained to the patient and their symptoms were checked upon arrival.  \par Masks were used by the health care providers and staff and the examination room was cleaned after the patient visit was completed.\par \par 1/31/2023\par postop check\par wound fine--small scab centrally\par likely resolving suture reaction\par \par to see Derm (Ligresti)\par \par f/u w me in 3 months\par \par Due to COVID 19, pre-visit patient instructions were explained to the patient and their symptoms were checked upon arrival.  \par Masks were used by the health care providers and staff and the examination room was cleaned after the patient visit was completed.\par \par \par 4/25/2023\par as above\par scalp fine\par pt happy\par no issues\par \par f/u prn

## 2023-04-25 NOTE — PHYSICAL EXAM
[NI] : Normal [de-identified] : NAD [de-identified] : frontal scalp incision is healing well, CDI with no seroma/hematoma/cellulitis noted. Mild swelling. Excellent overall contour and cosmesis.

## 2023-07-26 NOTE — ASU PREOP CHECKLIST - NSSDAENDDT_GEN_ALL_CORE
Name: Lacey Peña  Seen in consultation with Angelica Harvey for obesity, +TPO ab, hair thinning and lack of energy.  HPI:  Lacey Peña is a 57 year old female who presents for the evaluation of above.  Body mass index is 35.15 kg/m .  Wt Readings from Last 2 Encounters:   07/26/23 91.4 kg (201 lb 9.6 oz)   01/20/23 91.2 kg (201 lb)     Weight gain started: in early 50s  Highest weight as an adult: 201  Lowest weight as an adult: 170 lbs ( 3-4 years back)    Not able to loose weight.    Diets tried: trying to eliminate pasta, bread.    Gastric bypass history: no    Hypothyroidism:  No. H/o hyperthyroidism in 2000 and was on PTU for 18 months. No longer on thyroid medication.  Sister had thyroid cancer?  Pt had normal thyroid ultrasound.in 2021.  + TPO antibodies.  Other labs are in normal range.  Currently she is not on thyroid hormone replacement.    Use of Steroids:No  Family history of Obesity:No  Diet: currently pt is working on-- trying to limit carbs  Exercise: walking and stairs. Part time job of cleaning.  Menses: s/p menopause.  Diarrhea/Constipation:No  Changes in Hair or Skin:Yes: hair loss  Diabetes:No  Sleep Apnea/Snores:No  Hypertension:Yes: on metoprolol since 2001  Hyperlipidemia:No  Hirsutism:No  Stretch marks: no change  PMH/PSH:  HTN  [place]  Family Hx:  Family History   Problem Relation Age of Onset    Myocardial Infarction Sister      Social Hx:  Social History     Socioeconomic History    Marital status:      Spouse name: Not on file    Number of children: Not on file    Years of education: Not on file    Highest education level: Not on file   Occupational History    Not on file   Tobacco Use    Smoking status: Never    Smokeless tobacco: Never   Substance and Sexual Activity    Alcohol use: Not on file    Drug use: Not on file    Sexual activity: Not on file   Other Topics Concern    Not on file   Social History Narrative    Not on file     Social  "Determinants of Health     Financial Resource Strain: Not on file   Food Insecurity: Not on file   Transportation Needs: Not on file   Physical Activity: Not on file   Stress: Not on file   Social Connections: Not on file   Intimate Partner Violence: Not on file   Housing Stability: Not on file          MEDICATIONS:  has a current medication list which includes the following prescription(s): cholecalciferol, cyanocobalamin, metoprolol succinate er, clovgran, and probiotic.    ROS     ROS: 10 point ROS neg other than the symptoms noted above in the HPI.    Physical Exam   VS: BP (!) 152/96 (BP Location: Left arm, Patient Position: Chair, Cuff Size: Adult Regular)   Pulse 69   Temp 99.3  F (37.4  C) (Tympanic)   Resp 16   Ht 1.613 m (5' 3.5\")   Wt 91.4 kg (201 lb 9.6 oz)   LMP  (LMP Unknown)   SpO2 99%   Breastfeeding No   BMI 35.15 kg/m    GENERAL: AXOX3, NAD, well dressed, answering questions appropriately, appears stated age.  HEENT: No exopthalmous, no proptosis, EOMI, no lig lag, no retraction  NECK: Thyroid normal in size, non tender, no nodules were palpated.  CV: RRR, no rubs, gallops, no murmurs  LUNGS: CTAB, no wheezes, rales, or ronchi  ABDOMEN: +BS  EXTREMITIES: no edema, +pulses, no rashes, no lesions  NEUROLOGY: CN grossly intact, + DTR upper and lower extremity, no tremors  MSK: grossly intact  SKIN: no rashes, no lesions    LABS:  TFTs:  Component      Latest Ref Rn 1/7/2021  6:35 PM   Free T4      0.7 - 1.8 ng/dL 1.0    T4 Total      4.5 - 13.0 ug/dL 8.5    Thyroid Peroxidase Ab      0.0 - 5.6 IU/mL 26.1 (H)    TSH      0.30 - 5.00 uIU/mL 2.55    Thyroid Stimulating Immunoglobulin-TSI      <=0.54 IU/L <0.10       Legend:  (H) High      All pertinent notes, labs, and images personally reviewed by me.     A/P  Ms.Sheila TAD Peña is a 57 year old here for the evaluation of obestiy:    1.+ TPO antibodies:  History of hyperthyroidism many years back treated with PTU in 2001 for 18 " months.  Noted to have positive TPO antibodies but other thyroid hormone levels are in normal range.  She is not on thyroid hormone replacement.  Plan:  Discussed diagnosis, pathophysiology, management and treatment options of condition with pt.  Discussed Hashimoto diagnosis with patient.  As long as thyroid hormone levels are in range, thyroid hormone replacement is not indicated.  Recommend close follow-up and repeat labs annually with primary care provider in the setting of positive TPO antibodies.    Discussed s/s of hypothyroidism and hyperthyroidism to watch for.  The patient indicates understanding of these issues and agrees with the plan.    2. Obesity:  Body mass index is 35.15 kg/m .  Obesity is associated with a significant increase in mortality and risk of many disorders, including diabetes mellitus, hypertension, dyslipidemia, heart disease, stroke, sleep apnea, cancer, and many others. Conversely, weight loss is associated with a reduction in obesity-associated morbidity.  Endocrine evaluation of obesity includes Cushing's and thyroid dysfunction.  Will obtain TSH and freeT4 along with 24 hour urine collection.   Normal thyroid labs.  Plan:  Discussed diagnosis, pathophysiology, management and treatment options of condition with pt.    Recommend to get morning cortisol levels.  Further work-up based on that.  If cortisol levels are normal then if patient is interested can consider weight loss medication.  Lacey to follow up with Primary Care provider regarding elevated blood pressure.    Discussed:  I informed the pt that:  1.Weight loss medications can be taken to assist with weight reduction when combined with appropriate healthy lifestyle changes.  2.I discussed possible s/e, risks and benefits of weight loss medications.  3.All medications are FDA approved, however, some may be used ''off label'' for their weight loss benefitIs and some ''short term'' medications can be used for longer periods to  achieve desired clinical outcomes.  4.All patients taking weight loss medications must be seen in clinic for refill authorization.  Risks of obestiy discussed. Encourage healthy diet. Limit snacks, fluid calories, portions. Limit TV/computer time to one hour a day. Encourage physical activity. Recheck in six months or sooner with concerns.    Obesity is a biological preventable and treatable disease, which is associated with significantly increased risk of many acute and chronic health conditions. Obesity has now been recognized as a chronic disease by the American Medical Association.    A range of serious co-morbidities are associated with obesity including increased risk for hypertension, stroke, coronary artery disease, dyslipidemia, Type II diabetes, depression, sleep apnea, cancers of the colon, breast and endometrium, osteoarthritis and female infertility. Therefore, obesity is not just a problem; it s a disease that warrants serious evidence based treatements.    I explained to the patient relevant work up for the diagnosis and management of obesity and discussed treatment options. Body Mass Index (BMI) has been a standard means for calculating risk for overweight and obesity. The new American Association of Clinical Endocrinology (AACE) algorithm recommends lifestyle modifications as the initial phase of treatment for all patients with the BMI equal or greater than 25 kg/m2. Lifestyle modifications includes use of medical nutrition therapy, exercise, tobacco cessation, adequate quality and quantity of sleep, limited consumption of alcohol and reduced stress through implementation of a structured, multidisciplinary program.    In patients with complications associated with obesity, graded interventions are recommended including pharmacological therapy such as phentermine, orlistat, lorcaserin and phentermine/topiramate ER, contrave ( buproprion/naltreone) and the use of very low calorie meal replacement  programs.    If medical intervention is insufficient, surgical therapy may be considered, especially in patients with BMI greater than or equal to 35 kg/m2 with multiple complications. Surgical treatments include lap-band, gastric sleeve or gastric bypass surgery.      More than 50% of the time spent with Ms. Charline Peña on counseling / coordinating her care.        Follow-up:  As noted in AVS.    Dai Gold MD  Endocrinology   [facility]    CC: Angelica Harvey     30-Nov-2022 13:16

## 2023-07-28 NOTE — ASU DISCHARGE PLAN (ADULT/PEDIATRIC) - DISCHARGE PLAN IS COMPLETE AND GIVEN TO PATIENT
- Fall at home 3d ago with unclear etiology, no mechanical source identified  - Patient not reporting any headache or other pain on arrival  - CT head without evidence of acute traumatic injury  - Trauma team will sign-off on patient : Yes

## 2024-01-01 ENCOUNTER — INPATIENT (INPATIENT)
Facility: HOSPITAL | Age: 64
LOS: 2 days | Discharge: HOME | DRG: 204 | End: 2024-05-03
Attending: INTERNAL MEDICINE | Admitting: INTERNAL MEDICINE
Payer: COMMERCIAL

## 2024-01-01 ENCOUNTER — APPOINTMENT (OUTPATIENT)
Dept: CARDIOLOGY | Facility: CLINIC | Age: 64
End: 2024-01-01
Payer: COMMERCIAL

## 2024-01-01 ENCOUNTER — RESULT REVIEW (OUTPATIENT)
Age: 64
End: 2024-01-01

## 2024-01-01 ENCOUNTER — APPOINTMENT (OUTPATIENT)
Dept: CARDIOLOGY | Facility: CLINIC | Age: 64
End: 2024-01-01

## 2024-01-01 ENCOUNTER — RESULT CHARGE (OUTPATIENT)
Age: 64
End: 2024-01-01

## 2024-01-01 ENCOUNTER — OUTPATIENT (OUTPATIENT)
Dept: OUTPATIENT SERVICES | Facility: HOSPITAL | Age: 64
LOS: 1 days | End: 2024-01-01
Payer: COMMERCIAL

## 2024-01-01 ENCOUNTER — TRANSCRIPTION ENCOUNTER (OUTPATIENT)
Age: 64
End: 2024-01-01

## 2024-01-01 VITALS
HEART RATE: 55 BPM | RESPIRATION RATE: 17 BRPM | DIASTOLIC BLOOD PRESSURE: 74 MMHG | HEIGHT: 63 IN | OXYGEN SATURATION: 96 % | TEMPERATURE: 98 F | SYSTOLIC BLOOD PRESSURE: 131 MMHG | WEIGHT: 169.98 LBS

## 2024-01-01 VITALS
HEART RATE: 57 BPM | RESPIRATION RATE: 19 BRPM | DIASTOLIC BLOOD PRESSURE: 72 MMHG | SYSTOLIC BLOOD PRESSURE: 123 MMHG | OXYGEN SATURATION: 97 %

## 2024-01-01 VITALS
HEART RATE: 53 BPM | BODY MASS INDEX: 28.35 KG/M2 | DIASTOLIC BLOOD PRESSURE: 92 MMHG | OXYGEN SATURATION: 99 % | SYSTOLIC BLOOD PRESSURE: 142 MMHG | WEIGHT: 160 LBS | HEIGHT: 63 IN

## 2024-01-01 DIAGNOSIS — I50.23 ACUTE ON CHRONIC SYSTOLIC (CONGESTIVE) HEART FAILURE: ICD-10-CM

## 2024-01-01 DIAGNOSIS — I25.10 ATHEROSCLEROTIC HEART DISEASE OF NATIVE CORONARY ARTERY WITHOUT ANGINA PECTORIS: ICD-10-CM

## 2024-01-01 DIAGNOSIS — I50.22 CHRONIC SYSTOLIC (CONGESTIVE) HEART FAILURE: ICD-10-CM

## 2024-01-01 DIAGNOSIS — Z98.61 ATHEROSCLEROTIC HEART DISEASE OF NATIVE CORONARY ARTERY W/OUT ANGINA PECTORIS: ICD-10-CM

## 2024-01-01 DIAGNOSIS — R91.1 SOLITARY PULMONARY NODULE: ICD-10-CM

## 2024-01-01 DIAGNOSIS — I25.10 ATHEROSCLEROTIC HEART DISEASE OF NATIVE CORONARY ARTERY W/OUT ANGINA PECTORIS: ICD-10-CM

## 2024-01-01 DIAGNOSIS — I42.9 CARDIOMYOPATHY, UNSPECIFIED: ICD-10-CM

## 2024-01-01 DIAGNOSIS — Z91.148 PATIENT'S OTHER NONCOMPLIANCE WITH MEDICATION REGIMEN FOR OTHER REASON: ICD-10-CM

## 2024-01-01 DIAGNOSIS — E78.5 HYPERLIPIDEMIA, UNSPECIFIED: ICD-10-CM

## 2024-01-01 DIAGNOSIS — J47.9 BRONCHIECTASIS, UNCOMPLICATED: ICD-10-CM

## 2024-01-01 DIAGNOSIS — G47.33 OBSTRUCTIVE SLEEP APNEA (ADULT) (PEDIATRIC): ICD-10-CM

## 2024-01-01 DIAGNOSIS — I45.81 LONG QT SYNDROME: ICD-10-CM

## 2024-01-01 DIAGNOSIS — R06.9 UNSPECIFIED ABNORMALITIES OF BREATHING: ICD-10-CM

## 2024-01-01 DIAGNOSIS — I25.82 CHRONIC TOTAL OCCLUSION OF CORONARY ARTERY: ICD-10-CM

## 2024-01-01 DIAGNOSIS — Z86.79 PERSONAL HISTORY OF OTHER DISEASES OF THE CIRCULATORY SYSTEM: ICD-10-CM

## 2024-01-01 DIAGNOSIS — J44.9 CHRONIC OBSTRUCTIVE PULMONARY DISEASE, UNSPECIFIED: ICD-10-CM

## 2024-01-01 DIAGNOSIS — F17.210 NICOTINE DEPENDENCE, CIGARETTES, UNCOMPLICATED: ICD-10-CM

## 2024-01-01 DIAGNOSIS — Z87.891 PERSONAL HISTORY OF NICOTINE DEPENDENCE: ICD-10-CM

## 2024-01-01 DIAGNOSIS — I10 ESSENTIAL (PRIMARY) HYPERTENSION: ICD-10-CM

## 2024-01-01 DIAGNOSIS — I11.0 HYPERTENSIVE HEART DISEASE WITH HEART FAILURE: ICD-10-CM

## 2024-01-01 DIAGNOSIS — I34.0 NONRHEUMATIC MITRAL (VALVE) INSUFFICIENCY: ICD-10-CM

## 2024-01-01 DIAGNOSIS — E66.9 OBESITY, UNSPECIFIED: ICD-10-CM

## 2024-01-01 DIAGNOSIS — Z91.199 PATIENT'S NONCOMPLIANCE WITH OTHER MEDICAL TREATMENT AND REGIMEN DUE TO UNSPECIFIED REASON: ICD-10-CM

## 2024-01-01 DIAGNOSIS — Z71.6 TOBACCO ABUSE COUNSELING: ICD-10-CM

## 2024-01-01 DIAGNOSIS — I49.3 VENTRICULAR PREMATURE DEPOLARIZATION: ICD-10-CM

## 2024-01-01 DIAGNOSIS — Z00.8 ENCOUNTER FOR OTHER GENERAL EXAMINATION: ICD-10-CM

## 2024-01-01 LAB
A1C WITH ESTIMATED AVERAGE GLUCOSE RESULT: 5.7 % — HIGH (ref 4–5.6)
A1C WITH ESTIMATED AVERAGE GLUCOSE RESULT: 5.8 % — HIGH (ref 4–5.6)
ALBUMIN SERPL ELPH-MCNC: 3.9 G/DL — SIGNIFICANT CHANGE UP (ref 3.5–5.2)
ALBUMIN SERPL ELPH-MCNC: 4.2 G/DL — SIGNIFICANT CHANGE UP (ref 3.5–5.2)
ALBUMIN SERPL ELPH-MCNC: 4.3 G/DL — SIGNIFICANT CHANGE UP (ref 3.5–5.2)
ALP SERPL-CCNC: 100 U/L — SIGNIFICANT CHANGE UP (ref 30–115)
ALP SERPL-CCNC: 94 U/L — SIGNIFICANT CHANGE UP (ref 30–115)
ALP SERPL-CCNC: 95 U/L — SIGNIFICANT CHANGE UP (ref 30–115)
ALT FLD-CCNC: 11 U/L — SIGNIFICANT CHANGE UP (ref 0–41)
ALT FLD-CCNC: 12 U/L — SIGNIFICANT CHANGE UP (ref 0–41)
ALT FLD-CCNC: 13 U/L — SIGNIFICANT CHANGE UP (ref 0–41)
ANION GAP SERPL CALC-SCNC: 11 MMOL/L — SIGNIFICANT CHANGE UP (ref 7–14)
ANION GAP SERPL CALC-SCNC: 12 MMOL/L — SIGNIFICANT CHANGE UP (ref 7–14)
ANION GAP SERPL CALC-SCNC: 14 MMOL/L — SIGNIFICANT CHANGE UP (ref 7–14)
ANION GAP SERPL CALC-SCNC: 14 MMOL/L — SIGNIFICANT CHANGE UP (ref 7–14)
APTT BLD: 62.1 SEC — HIGH (ref 27–39.2)
APTT BLD: 64.1 SEC — HIGH (ref 27–39.2)
AST SERPL-CCNC: 15 U/L — SIGNIFICANT CHANGE UP (ref 0–41)
AST SERPL-CCNC: 17 U/L — SIGNIFICANT CHANGE UP (ref 0–41)
AST SERPL-CCNC: 18 U/L — SIGNIFICANT CHANGE UP (ref 0–41)
BASOPHILS # BLD AUTO: 0.07 K/UL — SIGNIFICANT CHANGE UP (ref 0–0.2)
BASOPHILS # BLD AUTO: 0.1 K/UL — SIGNIFICANT CHANGE UP (ref 0–0.2)
BASOPHILS # BLD AUTO: 0.11 K/UL — SIGNIFICANT CHANGE UP (ref 0–0.2)
BASOPHILS # BLD AUTO: 0.11 K/UL — SIGNIFICANT CHANGE UP (ref 0–0.2)
BASOPHILS NFR BLD AUTO: 0.6 % — SIGNIFICANT CHANGE UP (ref 0–1)
BASOPHILS NFR BLD AUTO: 1 % — SIGNIFICANT CHANGE UP (ref 0–1)
BASOPHILS NFR BLD AUTO: 1 % — SIGNIFICANT CHANGE UP (ref 0–1)
BASOPHILS NFR BLD AUTO: 1.2 % — HIGH (ref 0–1)
BILIRUB SERPL-MCNC: 0.4 MG/DL — SIGNIFICANT CHANGE UP (ref 0.2–1.2)
BILIRUB SERPL-MCNC: 0.5 MG/DL — SIGNIFICANT CHANGE UP (ref 0.2–1.2)
BILIRUB SERPL-MCNC: 0.8 MG/DL — SIGNIFICANT CHANGE UP (ref 0.2–1.2)
BUN SERPL-MCNC: 17 MG/DL — SIGNIFICANT CHANGE UP (ref 10–20)
BUN SERPL-MCNC: 20 MG/DL — SIGNIFICANT CHANGE UP (ref 10–20)
CALCIUM SERPL-MCNC: 8.9 MG/DL — SIGNIFICANT CHANGE UP (ref 8.4–10.5)
CALCIUM SERPL-MCNC: 9 MG/DL — SIGNIFICANT CHANGE UP (ref 8.4–10.5)
CALCIUM SERPL-MCNC: 9.2 MG/DL — SIGNIFICANT CHANGE UP (ref 8.4–10.5)
CALCIUM SERPL-MCNC: 9.5 MG/DL — SIGNIFICANT CHANGE UP (ref 8.4–10.4)
CHLORIDE SERPL-SCNC: 105 MMOL/L — SIGNIFICANT CHANGE UP (ref 98–110)
CHLORIDE SERPL-SCNC: 105 MMOL/L — SIGNIFICANT CHANGE UP (ref 98–110)
CHLORIDE SERPL-SCNC: 106 MMOL/L — SIGNIFICANT CHANGE UP (ref 98–110)
CHLORIDE SERPL-SCNC: 107 MMOL/L — SIGNIFICANT CHANGE UP (ref 98–110)
CHOLEST SERPL-MCNC: 203 MG/DL — HIGH
CO2 SERPL-SCNC: 21 MMOL/L — SIGNIFICANT CHANGE UP (ref 17–32)
CO2 SERPL-SCNC: 22 MMOL/L — SIGNIFICANT CHANGE UP (ref 17–32)
CO2 SERPL-SCNC: 24 MMOL/L — SIGNIFICANT CHANGE UP (ref 17–32)
CO2 SERPL-SCNC: 25 MMOL/L — SIGNIFICANT CHANGE UP (ref 17–32)
CREAT SERPL-MCNC: 1 MG/DL — SIGNIFICANT CHANGE UP (ref 0.7–1.5)
CREAT SERPL-MCNC: 1.1 MG/DL — SIGNIFICANT CHANGE UP (ref 0.7–1.5)
D DIMER BLD IA.RAPID-MCNC: 171 NG/ML DDU — SIGNIFICANT CHANGE UP
EGFR: 75 ML/MIN/1.73M2 — SIGNIFICANT CHANGE UP
EGFR: 85 ML/MIN/1.73M2 — SIGNIFICANT CHANGE UP
EOSINOPHIL # BLD AUTO: 0.08 K/UL — SIGNIFICANT CHANGE UP (ref 0–0.7)
EOSINOPHIL # BLD AUTO: 0.15 K/UL — SIGNIFICANT CHANGE UP (ref 0–0.7)
EOSINOPHIL # BLD AUTO: 0.18 K/UL — SIGNIFICANT CHANGE UP (ref 0–0.7)
EOSINOPHIL # BLD AUTO: 0.19 K/UL — SIGNIFICANT CHANGE UP (ref 0–0.7)
EOSINOPHIL NFR BLD AUTO: 0.7 % — SIGNIFICANT CHANGE UP (ref 0–8)
EOSINOPHIL NFR BLD AUTO: 1.4 % — SIGNIFICANT CHANGE UP (ref 0–8)
EOSINOPHIL NFR BLD AUTO: 1.8 % — SIGNIFICANT CHANGE UP (ref 0–8)
EOSINOPHIL NFR BLD AUTO: 2 % — SIGNIFICANT CHANGE UP (ref 0–8)
ESTIMATED AVERAGE GLUCOSE: 117 MG/DL — HIGH (ref 68–114)
ESTIMATED AVERAGE GLUCOSE: 120 MG/DL — HIGH (ref 68–114)
GLUCOSE BLDC GLUCOMTR-MCNC: 94 MG/DL — SIGNIFICANT CHANGE UP (ref 70–99)
GLUCOSE SERPL-MCNC: 106 MG/DL — HIGH (ref 70–99)
GLUCOSE SERPL-MCNC: 112 MG/DL — HIGH (ref 70–99)
GLUCOSE SERPL-MCNC: 86 MG/DL — SIGNIFICANT CHANGE UP (ref 70–99)
GLUCOSE SERPL-MCNC: 93 MG/DL — SIGNIFICANT CHANGE UP (ref 70–99)
HCT VFR BLD CALC: 47.9 % — SIGNIFICANT CHANGE UP (ref 42–52)
HCT VFR BLD CALC: 48.1 % — SIGNIFICANT CHANGE UP (ref 42–52)
HCT VFR BLD CALC: 48.2 % — SIGNIFICANT CHANGE UP (ref 42–52)
HCT VFR BLD CALC: 48.6 % — SIGNIFICANT CHANGE UP (ref 42–52)
HDLC SERPL-MCNC: 35 MG/DL — LOW
HGB BLD-MCNC: 15.1 G/DL — SIGNIFICANT CHANGE UP (ref 14–18)
HGB BLD-MCNC: 15.6 G/DL — SIGNIFICANT CHANGE UP (ref 14–18)
HGB BLD-MCNC: 15.9 G/DL — SIGNIFICANT CHANGE UP (ref 14–18)
HGB BLD-MCNC: 15.9 G/DL — SIGNIFICANT CHANGE UP (ref 14–18)
IMM GRANULOCYTES NFR BLD AUTO: 0.2 % — SIGNIFICANT CHANGE UP (ref 0.1–0.3)
IMM GRANULOCYTES NFR BLD AUTO: 0.3 % — SIGNIFICANT CHANGE UP (ref 0.1–0.3)
IMM GRANULOCYTES NFR BLD AUTO: 0.4 % — HIGH (ref 0.1–0.3)
IMM GRANULOCYTES NFR BLD AUTO: 0.4 % — HIGH (ref 0.1–0.3)
INR BLD: 1.24 RATIO — SIGNIFICANT CHANGE UP (ref 0.65–1.3)
LIDOCAIN IGE QN: 33 U/L — SIGNIFICANT CHANGE UP (ref 7–60)
LIPID PNL WITH DIRECT LDL SERPL: 154 MG/DL — HIGH
LYMPHOCYTES # BLD AUTO: 1.54 K/UL — SIGNIFICANT CHANGE UP (ref 1.2–3.4)
LYMPHOCYTES # BLD AUTO: 1.96 K/UL — SIGNIFICANT CHANGE UP (ref 1.2–3.4)
LYMPHOCYTES # BLD AUTO: 13.9 % — LOW (ref 20.5–51.1)
LYMPHOCYTES # BLD AUTO: 18.7 % — LOW (ref 20.5–51.1)
LYMPHOCYTES # BLD AUTO: 2.09 K/UL — SIGNIFICANT CHANGE UP (ref 1.2–3.4)
LYMPHOCYTES # BLD AUTO: 2.45 K/UL — SIGNIFICANT CHANGE UP (ref 1.2–3.4)
LYMPHOCYTES # BLD AUTO: 20.2 % — LOW (ref 20.5–51.1)
LYMPHOCYTES # BLD AUTO: 27.3 % — SIGNIFICANT CHANGE UP (ref 20.5–51.1)
MAGNESIUM SERPL-MCNC: 2 MG/DL — SIGNIFICANT CHANGE UP (ref 1.8–2.4)
MAGNESIUM SERPL-MCNC: 2 MG/DL — SIGNIFICANT CHANGE UP (ref 1.8–2.4)
MAGNESIUM SERPL-MCNC: 2.1 MG/DL — SIGNIFICANT CHANGE UP (ref 1.8–2.4)
MCHC RBC-ENTMCNC: 29.3 PG — SIGNIFICANT CHANGE UP (ref 27–31)
MCHC RBC-ENTMCNC: 29.7 PG — SIGNIFICANT CHANGE UP (ref 27–31)
MCHC RBC-ENTMCNC: 29.8 PG — SIGNIFICANT CHANGE UP (ref 27–31)
MCHC RBC-ENTMCNC: 30.2 PG — SIGNIFICANT CHANGE UP (ref 27–31)
MCHC RBC-ENTMCNC: 31.4 G/DL — LOW (ref 32–37)
MCHC RBC-ENTMCNC: 32.1 G/DL — SIGNIFICANT CHANGE UP (ref 32–37)
MCHC RBC-ENTMCNC: 33 G/DL — SIGNIFICANT CHANGE UP (ref 32–37)
MCHC RBC-ENTMCNC: 33.2 G/DL — SIGNIFICANT CHANGE UP (ref 32–37)
MCV RBC AUTO: 89.7 FL — SIGNIFICANT CHANGE UP (ref 80–94)
MCV RBC AUTO: 91.5 FL — SIGNIFICANT CHANGE UP (ref 80–94)
MCV RBC AUTO: 92.4 FL — SIGNIFICANT CHANGE UP (ref 80–94)
MCV RBC AUTO: 93.2 FL — SIGNIFICANT CHANGE UP (ref 80–94)
MONOCYTES # BLD AUTO: 0.91 K/UL — HIGH (ref 0.1–0.6)
MONOCYTES # BLD AUTO: 0.91 K/UL — HIGH (ref 0.1–0.6)
MONOCYTES # BLD AUTO: 0.92 K/UL — HIGH (ref 0.1–0.6)
MONOCYTES # BLD AUTO: 0.97 K/UL — HIGH (ref 0.1–0.6)
MONOCYTES NFR BLD AUTO: 10.1 % — HIGH (ref 1.7–9.3)
MONOCYTES NFR BLD AUTO: 8.7 % — SIGNIFICANT CHANGE UP (ref 1.7–9.3)
MONOCYTES NFR BLD AUTO: 8.8 % — SIGNIFICANT CHANGE UP (ref 1.7–9.3)
MONOCYTES NFR BLD AUTO: 8.8 % — SIGNIFICANT CHANGE UP (ref 1.7–9.3)
NEUTROPHILS # BLD AUTO: 5.31 K/UL — SIGNIFICANT CHANGE UP (ref 1.4–6.5)
NEUTROPHILS # BLD AUTO: 7.01 K/UL — HIGH (ref 1.4–6.5)
NEUTROPHILS # BLD AUTO: 7.31 K/UL — HIGH (ref 1.4–6.5)
NEUTROPHILS # BLD AUTO: 8.41 K/UL — HIGH (ref 1.4–6.5)
NEUTROPHILS NFR BLD AUTO: 59.2 % — SIGNIFICANT CHANGE UP (ref 42.2–75.2)
NEUTROPHILS NFR BLD AUTO: 67.8 % — SIGNIFICANT CHANGE UP (ref 42.2–75.2)
NEUTROPHILS NFR BLD AUTO: 69.8 % — SIGNIFICANT CHANGE UP (ref 42.2–75.2)
NEUTROPHILS NFR BLD AUTO: 75.7 % — HIGH (ref 42.2–75.2)
NON HDL CHOLESTEROL: 168 MG/DL — HIGH
NRBC # BLD: 0 /100 WBCS — SIGNIFICANT CHANGE UP (ref 0–0)
NT-PROBNP SERPL-SCNC: 4029 PG/ML — HIGH (ref 0–300)
PLATELET # BLD AUTO: 225 K/UL — SIGNIFICANT CHANGE UP (ref 130–400)
PLATELET # BLD AUTO: 234 K/UL — SIGNIFICANT CHANGE UP (ref 130–400)
PLATELET # BLD AUTO: 235 K/UL — SIGNIFICANT CHANGE UP (ref 130–400)
PLATELET # BLD AUTO: 235 K/UL — SIGNIFICANT CHANGE UP (ref 130–400)
PMV BLD: 10 FL — SIGNIFICANT CHANGE UP (ref 7.4–10.4)
PMV BLD: 10.1 FL — SIGNIFICANT CHANGE UP (ref 7.4–10.4)
PMV BLD: 10.3 FL — SIGNIFICANT CHANGE UP (ref 7.4–10.4)
PMV BLD: 10.4 FL — SIGNIFICANT CHANGE UP (ref 7.4–10.4)
POTASSIUM SERPL-MCNC: 3.8 MMOL/L — SIGNIFICANT CHANGE UP (ref 3.5–5)
POTASSIUM SERPL-MCNC: 4 MMOL/L — SIGNIFICANT CHANGE UP (ref 3.5–5)
POTASSIUM SERPL-MCNC: 4.1 MMOL/L — SIGNIFICANT CHANGE UP (ref 3.5–5)
POTASSIUM SERPL-MCNC: 4.3 MMOL/L — SIGNIFICANT CHANGE UP (ref 3.5–5)
POTASSIUM SERPL-SCNC: 3.8 MMOL/L — SIGNIFICANT CHANGE UP (ref 3.5–5)
POTASSIUM SERPL-SCNC: 4 MMOL/L — SIGNIFICANT CHANGE UP (ref 3.5–5)
POTASSIUM SERPL-SCNC: 4.1 MMOL/L — SIGNIFICANT CHANGE UP (ref 3.5–5)
POTASSIUM SERPL-SCNC: 4.3 MMOL/L — SIGNIFICANT CHANGE UP (ref 3.5–5)
PROCALCITONIN SERPL-MCNC: 0.04 NG/ML — SIGNIFICANT CHANGE UP (ref 0.02–0.1)
PROT SERPL-MCNC: 6.2 G/DL — SIGNIFICANT CHANGE UP (ref 6–8)
PROT SERPL-MCNC: 6.3 G/DL — SIGNIFICANT CHANGE UP (ref 6–8)
PROT SERPL-MCNC: 6.7 G/DL — SIGNIFICANT CHANGE UP (ref 6–8)
PROTHROM AB SERPL-ACNC: 14.2 SEC — HIGH (ref 9.95–12.87)
RBC # BLD: 5.16 M/UL — SIGNIFICANT CHANGE UP (ref 4.7–6.1)
RBC # BLD: 5.26 M/UL — SIGNIFICANT CHANGE UP (ref 4.7–6.1)
RBC # BLD: 5.27 M/UL — SIGNIFICANT CHANGE UP (ref 4.7–6.1)
RBC # BLD: 5.34 M/UL — SIGNIFICANT CHANGE UP (ref 4.7–6.1)
RBC # FLD: 13.9 % — SIGNIFICANT CHANGE UP (ref 11.5–14.5)
RBC # FLD: 14.2 % — SIGNIFICANT CHANGE UP (ref 11.5–14.5)
RBC # FLD: 14.3 % — SIGNIFICANT CHANGE UP (ref 11.5–14.5)
RBC # FLD: 14.4 % — SIGNIFICANT CHANGE UP (ref 11.5–14.5)
SODIUM SERPL-SCNC: 140 MMOL/L — SIGNIFICANT CHANGE UP (ref 135–146)
SODIUM SERPL-SCNC: 141 MMOL/L — SIGNIFICANT CHANGE UP (ref 135–146)
SODIUM SERPL-SCNC: 142 MMOL/L — SIGNIFICANT CHANGE UP (ref 135–146)
SODIUM SERPL-SCNC: 143 MMOL/L — SIGNIFICANT CHANGE UP (ref 135–146)
TRIGL SERPL-MCNC: 67 MG/DL — SIGNIFICANT CHANGE UP
TROPONIN T, HIGH SENSITIVITY RESULT: 43 NG/L — HIGH (ref 6–21)
TROPONIN T, HIGH SENSITIVITY RESULT: 43 NG/L — HIGH (ref 6–21)
TSH SERPL-MCNC: 1.56 UIU/ML — SIGNIFICANT CHANGE UP (ref 0.27–4.2)
WBC # BLD: 10.34 K/UL — SIGNIFICANT CHANGE UP (ref 4.8–10.8)
WBC # BLD: 10.48 K/UL — SIGNIFICANT CHANGE UP (ref 4.8–10.8)
WBC # BLD: 11.11 K/UL — HIGH (ref 4.8–10.8)
WBC # BLD: 8.98 K/UL — SIGNIFICANT CHANGE UP (ref 4.8–10.8)
WBC # FLD AUTO: 10.34 K/UL — SIGNIFICANT CHANGE UP (ref 4.8–10.8)
WBC # FLD AUTO: 10.48 K/UL — SIGNIFICANT CHANGE UP (ref 4.8–10.8)
WBC # FLD AUTO: 11.11 K/UL — HIGH (ref 4.8–10.8)
WBC # FLD AUTO: 8.98 K/UL — SIGNIFICANT CHANGE UP (ref 4.8–10.8)

## 2024-01-01 PROCEDURE — C1769: CPT

## 2024-01-01 PROCEDURE — C1887: CPT

## 2024-01-01 PROCEDURE — 85610 PROTHROMBIN TIME: CPT

## 2024-01-01 PROCEDURE — 99285 EMERGENCY DEPT VISIT HI MDM: CPT

## 2024-01-01 PROCEDURE — 71046 X-RAY EXAM CHEST 2 VIEWS: CPT | Mod: 26

## 2024-01-01 PROCEDURE — 92978 ENDOLUMINL IVUS OCT C 1ST: CPT | Mod: LD

## 2024-01-01 PROCEDURE — 85025 COMPLETE CBC W/AUTO DIFF WBC: CPT

## 2024-01-01 PROCEDURE — 82962 GLUCOSE BLOOD TEST: CPT

## 2024-01-01 PROCEDURE — 93000 ELECTROCARDIOGRAM COMPLETE: CPT

## 2024-01-01 PROCEDURE — 75574 CT ANGIO HRT W/3D IMAGE: CPT | Mod: 26

## 2024-01-01 PROCEDURE — G2211 COMPLEX E/M VISIT ADD ON: CPT

## 2024-01-01 PROCEDURE — 71045 X-RAY EXAM CHEST 1 VIEW: CPT | Mod: 26

## 2024-01-01 PROCEDURE — C1753: CPT

## 2024-01-01 PROCEDURE — 93306 TTE W/DOPPLER COMPLETE: CPT | Mod: 26

## 2024-01-01 PROCEDURE — C1894: CPT

## 2024-01-01 PROCEDURE — C1874: CPT

## 2024-01-01 PROCEDURE — 80061 LIPID PANEL: CPT

## 2024-01-01 PROCEDURE — 84484 ASSAY OF TROPONIN QUANT: CPT

## 2024-01-01 PROCEDURE — 71045 X-RAY EXAM CHEST 1 VIEW: CPT

## 2024-01-01 PROCEDURE — 80053 COMPREHEN METABOLIC PANEL: CPT

## 2024-01-01 PROCEDURE — 93458 L HRT ARTERY/VENTRICLE ANGIO: CPT | Mod: 59

## 2024-01-01 PROCEDURE — 93571 IV DOP VEL&/PRESS C FLO 1ST: CPT | Mod: 26,LD

## 2024-01-01 PROCEDURE — 84145 PROCALCITONIN (PCT): CPT

## 2024-01-01 PROCEDURE — 99238 HOSP IP/OBS DSCHRG MGMT 30/<: CPT

## 2024-01-01 PROCEDURE — 93010 ELECTROCARDIOGRAM REPORT: CPT

## 2024-01-01 PROCEDURE — 93571 IV DOP VEL&/PRESS C FLO 1ST: CPT | Mod: LD

## 2024-01-01 PROCEDURE — 71250 CT THORAX DX C-: CPT

## 2024-01-01 PROCEDURE — 99222 1ST HOSP IP/OBS MODERATE 55: CPT

## 2024-01-01 PROCEDURE — 99232 SBSQ HOSP IP/OBS MODERATE 35: CPT | Mod: 57

## 2024-01-01 PROCEDURE — 99291 CRITICAL CARE FIRST HOUR: CPT | Mod: 25

## 2024-01-01 PROCEDURE — 71250 CT THORAX DX C-: CPT | Mod: 26

## 2024-01-01 PROCEDURE — 85730 THROMBOPLASTIN TIME PARTIAL: CPT

## 2024-01-01 PROCEDURE — 93005 ELECTROCARDIOGRAM TRACING: CPT

## 2024-01-01 PROCEDURE — 84443 ASSAY THYROID STIM HORMONE: CPT

## 2024-01-01 PROCEDURE — 99232 SBSQ HOSP IP/OBS MODERATE 35: CPT

## 2024-01-01 PROCEDURE — C9600: CPT | Mod: LD

## 2024-01-01 PROCEDURE — 80048 BASIC METABOLIC PNL TOTAL CA: CPT

## 2024-01-01 PROCEDURE — 93458 L HRT ARTERY/VENTRICLE ANGIO: CPT | Mod: 26,XU

## 2024-01-01 PROCEDURE — 92978 ENDOLUMINL IVUS OCT C 1ST: CPT | Mod: 26,LD

## 2024-01-01 PROCEDURE — 83036 HEMOGLOBIN GLYCOSYLATED A1C: CPT

## 2024-01-01 PROCEDURE — 83735 ASSAY OF MAGNESIUM: CPT

## 2024-01-01 PROCEDURE — C1725: CPT

## 2024-01-01 PROCEDURE — 75574 CT ANGIO HRT W/3D IMAGE: CPT | Mod: MC

## 2024-01-01 PROCEDURE — 93306 TTE W/DOPPLER COMPLETE: CPT

## 2024-01-01 PROCEDURE — 99215 OFFICE O/P EST HI 40 MIN: CPT

## 2024-01-01 PROCEDURE — 92928 PRQ TCAT PLMT NTRAC ST 1 LES: CPT | Mod: LD

## 2024-01-01 PROCEDURE — 99222 1ST HOSP IP/OBS MODERATE 55: CPT | Mod: GC

## 2024-01-01 PROCEDURE — 36415 COLL VENOUS BLD VENIPUNCTURE: CPT

## 2024-01-01 RX ORDER — ATORVASTATIN CALCIUM 80 MG/1
80 TABLET, FILM COATED ORAL AT BEDTIME
Refills: 0 | Status: DISCONTINUED | OUTPATIENT
Start: 2024-01-01 | End: 2024-01-01

## 2024-01-01 RX ORDER — ISOSORBIDE MONONITRATE 60 MG/1
30 TABLET, EXTENDED RELEASE ORAL DAILY
Refills: 0 | Status: DISCONTINUED | OUTPATIENT
Start: 2024-01-01 | End: 2024-01-01

## 2024-01-01 RX ORDER — SACUBITRIL AND VALSARTAN 24; 26 MG/1; MG/1
24-26 TABLET, FILM COATED ORAL TWICE DAILY
Qty: 60 | Refills: 6 | Status: ACTIVE | COMMUNITY
Start: 2024-01-01 | End: 1900-01-01

## 2024-01-01 RX ORDER — EMPAGLIFLOZIN 10 MG/1
10 TABLET, FILM COATED ORAL
Qty: 30 | Refills: 5 | Status: ACTIVE | COMMUNITY
Start: 2024-01-01 | End: 1900-01-01

## 2024-01-01 RX ORDER — LOSARTAN POTASSIUM 100 MG/1
25 TABLET, FILM COATED ORAL DAILY
Refills: 0 | Status: DISCONTINUED | OUTPATIENT
Start: 2024-01-01 | End: 2024-01-01

## 2024-01-01 RX ORDER — METOPROLOL SUCCINATE 50 MG/1
50 TABLET, EXTENDED RELEASE ORAL DAILY
Refills: 0 | Status: ACTIVE | COMMUNITY

## 2024-01-01 RX ORDER — REGADENOSON 0.08 MG/ML
0.4 INJECTION, SOLUTION INTRAVENOUS ONCE
Refills: 0 | Status: DISCONTINUED | OUTPATIENT
Start: 2024-01-01 | End: 2024-01-01

## 2024-01-01 RX ORDER — POTASSIUM CHLORIDE 20 MEQ
40 PACKET (EA) ORAL ONCE
Refills: 0 | Status: COMPLETED | OUTPATIENT
Start: 2024-01-01 | End: 2024-01-01

## 2024-01-01 RX ORDER — ASPIRIN ENTERIC COATED TABLETS 81 MG 81 MG/1
81 TABLET, DELAYED RELEASE ORAL
Refills: 0 | Status: ACTIVE | COMMUNITY

## 2024-01-01 RX ORDER — LOSARTAN POTASSIUM 100 MG/1
1 TABLET, FILM COATED ORAL
Qty: 30 | Refills: 6
Start: 2024-01-01 | End: 2024-11-28

## 2024-01-01 RX ORDER — NIFEDIPINE 30 MG
30 TABLET, EXTENDED RELEASE 24 HR ORAL ONCE
Refills: 0 | Status: COMPLETED | OUTPATIENT
Start: 2024-01-01 | End: 2024-01-01

## 2024-01-01 RX ORDER — LOSARTAN POTASSIUM 25 MG/1
25 TABLET, FILM COATED ORAL DAILY
Refills: 0 | Status: DISCONTINUED | COMMUNITY
End: 2024-01-01

## 2024-01-01 RX ORDER — ASPIRIN/CALCIUM CARB/MAGNESIUM 324 MG
324 TABLET ORAL ONCE
Refills: 0 | Status: COMPLETED | OUTPATIENT
Start: 2024-01-01 | End: 2024-01-01

## 2024-01-01 RX ORDER — LOSARTAN POTASSIUM 25 MG/1
25 TABLET, FILM COATED ORAL DAILY
Qty: 90 | Refills: 3 | Status: DISCONTINUED | COMMUNITY
Start: 2023-04-12 | End: 2024-01-01

## 2024-01-01 RX ORDER — ISOSORBIDE DINITRATE 30 MG/1
30 TABLET ORAL
Refills: 0 | Status: ACTIVE | COMMUNITY

## 2024-01-01 RX ORDER — ATORVASTATIN CALCIUM 80 MG/1
1 TABLET, FILM COATED ORAL
Qty: 30 | Refills: 5
Start: 2024-01-01 | End: 2024-10-29

## 2024-01-01 RX ORDER — SODIUM CHLORIDE 9 MG/ML
250 INJECTION, SOLUTION INTRAVENOUS ONCE
Refills: 0 | Status: COMPLETED | OUTPATIENT
Start: 2024-01-01 | End: 2024-01-01

## 2024-01-01 RX ORDER — CLOPIDOGREL BISULFATE 75 MG/1
75 TABLET, FILM COATED ORAL DAILY
Refills: 0 | Status: DISCONTINUED | OUTPATIENT
Start: 2024-01-01 | End: 2024-01-01

## 2024-01-01 RX ORDER — AMLODIPINE BESYLATE 2.5 MG/1
1 TABLET ORAL
Qty: 0 | Refills: 0 | DISCHARGE

## 2024-01-01 RX ORDER — SODIUM CHLORIDE 9 MG/ML
250 INJECTION INTRAMUSCULAR; INTRAVENOUS; SUBCUTANEOUS ONCE
Refills: 0 | Status: DISCONTINUED | OUTPATIENT
Start: 2024-01-01 | End: 2024-01-01

## 2024-01-01 RX ORDER — EMPAGLIFLOZIN 10 MG/1
10 TABLET, FILM COATED ORAL DAILY
Qty: 90 | Refills: 3 | Status: DISCONTINUED | COMMUNITY
Start: 2023-04-12 | End: 2024-01-01

## 2024-01-01 RX ORDER — FUROSEMIDE 40 MG
20 TABLET ORAL ONCE
Refills: 0 | Status: COMPLETED | OUTPATIENT
Start: 2024-01-01 | End: 2024-01-01

## 2024-01-01 RX ORDER — METOPROLOL TARTRATE 50 MG/1
50 TABLET, FILM COATED ORAL
Qty: 2 | Refills: 0 | Status: DISCONTINUED | COMMUNITY
Start: 2023-01-23 | End: 2024-01-01

## 2024-01-01 RX ORDER — ASPIRIN/CALCIUM CARB/MAGNESIUM 324 MG
81 TABLET ORAL DAILY
Refills: 0 | Status: DISCONTINUED | OUTPATIENT
Start: 2024-01-01 | End: 2024-01-01

## 2024-01-01 RX ORDER — ONDANSETRON 8 MG/1
4 TABLET, FILM COATED ORAL ONCE
Refills: 0 | Status: COMPLETED | OUTPATIENT
Start: 2024-01-01 | End: 2024-01-01

## 2024-01-01 RX ORDER — ATORVASTATIN CALCIUM 80 MG/1
80 TABLET, FILM COATED ORAL DAILY
Refills: 0 | Status: ACTIVE | COMMUNITY

## 2024-01-01 RX ORDER — CLOPIDOGREL BISULFATE 75 MG/1
75 TABLET, FILM COATED ORAL
Qty: 90 | Refills: 3 | Status: ACTIVE | COMMUNITY
Start: 2024-01-01 | End: 1900-01-01

## 2024-01-01 RX ORDER — MORPHINE SULFATE 50 MG/1
2 CAPSULE, EXTENDED RELEASE ORAL ONCE
Refills: 0 | Status: DISCONTINUED | OUTPATIENT
Start: 2024-01-01 | End: 2024-01-01

## 2024-01-01 RX ORDER — ISOSORBIDE MONONITRATE 60 MG/1
1 TABLET, EXTENDED RELEASE ORAL
Qty: 30 | Refills: 5
Start: 2024-01-01 | End: 2024-10-29

## 2024-01-01 RX ORDER — MAGNESIUM SULFATE 500 MG/ML
2 VIAL (ML) INJECTION ONCE
Refills: 0 | Status: COMPLETED | OUTPATIENT
Start: 2024-01-01 | End: 2024-01-01

## 2024-01-01 RX ORDER — CLOPIDOGREL BISULFATE 75 MG/1
1 TABLET, FILM COATED ORAL
Qty: 30 | Refills: 5
Start: 2024-01-01 | End: 2024-10-29

## 2024-01-01 RX ORDER — METOPROLOL TARTRATE 50 MG
1 TABLET ORAL
Qty: 30 | Refills: 5
Start: 2024-01-01 | End: 2024-10-29

## 2024-01-01 RX ORDER — SODIUM CHLORIDE 9 MG/ML
500 INJECTION, SOLUTION INTRAVENOUS ONCE
Refills: 0 | Status: DISCONTINUED | OUTPATIENT
Start: 2024-01-01 | End: 2024-01-01

## 2024-01-01 RX ORDER — SODIUM CHLORIDE 9 MG/ML
1000 INJECTION INTRAMUSCULAR; INTRAVENOUS; SUBCUTANEOUS
Refills: 0 | Status: DISCONTINUED | OUTPATIENT
Start: 2024-01-01 | End: 2024-01-01

## 2024-01-01 RX ORDER — LABETALOL HCL 100 MG
10 TABLET ORAL ONCE
Refills: 0 | Status: COMPLETED | OUTPATIENT
Start: 2024-01-01 | End: 2024-01-01

## 2024-01-01 RX ORDER — METOPROLOL TARTRATE 50 MG
25 TABLET ORAL
Refills: 0 | Status: DISCONTINUED | OUTPATIENT
Start: 2024-01-01 | End: 2024-01-01

## 2024-01-01 RX ORDER — ASPIRIN/CALCIUM CARB/MAGNESIUM 324 MG
1 TABLET ORAL
Qty: 30 | Refills: 5
Start: 2024-01-01 | End: 2024-10-29

## 2024-01-01 RX ORDER — ENOXAPARIN SODIUM 100 MG/ML
40 INJECTION SUBCUTANEOUS EVERY 24 HOURS
Refills: 0 | Status: DISCONTINUED | OUTPATIENT
Start: 2024-01-01 | End: 2024-01-01

## 2024-01-01 RX ORDER — CHLORHEXIDINE GLUCONATE 213 G/1000ML
1 SOLUTION TOPICAL DAILY
Refills: 0 | Status: DISCONTINUED | OUTPATIENT
Start: 2024-01-01 | End: 2024-01-01

## 2024-01-01 RX ORDER — SACUBITRIL AND VALSARTAN 24; 26 MG/1; MG/1
1 TABLET, FILM COATED ORAL
Refills: 0 | Status: DISCONTINUED | OUTPATIENT
Start: 2024-01-01 | End: 2024-01-01

## 2024-01-01 RX ORDER — DAPAGLIFLOZIN 10 MG/1
10 TABLET, FILM COATED ORAL DAILY
Refills: 0 | Status: DISCONTINUED | OUTPATIENT
Start: 2024-01-01 | End: 2024-01-01

## 2024-01-01 RX ADMIN — Medication 30 MILLILITER(S): at 20:03

## 2024-01-01 RX ADMIN — MORPHINE SULFATE 2 MILLIGRAM(S): 50 CAPSULE, EXTENDED RELEASE ORAL at 05:53

## 2024-01-01 RX ADMIN — ONDANSETRON 4 MILLIGRAM(S): 8 TABLET, FILM COATED ORAL at 05:38

## 2024-01-01 RX ADMIN — Medication 20 MILLIGRAM(S): at 14:49

## 2024-01-01 RX ADMIN — Medication 81 MILLIGRAM(S): at 11:03

## 2024-01-01 RX ADMIN — SODIUM CHLORIDE 250 MILLILITER(S): 9 INJECTION, SOLUTION INTRAVENOUS at 05:39

## 2024-01-01 RX ADMIN — ATORVASTATIN CALCIUM 80 MILLIGRAM(S): 80 TABLET, FILM COATED ORAL at 21:08

## 2024-01-01 RX ADMIN — Medication 81 MILLIGRAM(S): at 10:57

## 2024-01-01 RX ADMIN — ATORVASTATIN CALCIUM 80 MILLIGRAM(S): 80 TABLET, FILM COATED ORAL at 22:04

## 2024-01-01 RX ADMIN — Medication 25 GRAM(S): at 16:01

## 2024-01-01 RX ADMIN — CHLORHEXIDINE GLUCONATE 1 APPLICATION(S): 213 SOLUTION TOPICAL at 11:03

## 2024-01-01 RX ADMIN — LOSARTAN POTASSIUM 25 MILLIGRAM(S): 100 TABLET, FILM COATED ORAL at 21:08

## 2024-01-01 RX ADMIN — Medication 40 MILLIEQUIVALENT(S): at 07:17

## 2024-01-01 RX ADMIN — ATORVASTATIN CALCIUM 80 MILLIGRAM(S): 80 TABLET, FILM COATED ORAL at 00:17

## 2024-01-01 RX ADMIN — DAPAGLIFLOZIN 10 MILLIGRAM(S): 10 TABLET, FILM COATED ORAL at 11:04

## 2024-01-01 RX ADMIN — CLOPIDOGREL BISULFATE 75 MILLIGRAM(S): 75 TABLET, FILM COATED ORAL at 11:04

## 2024-01-01 RX ADMIN — MORPHINE SULFATE 2 MILLIGRAM(S): 50 CAPSULE, EXTENDED RELEASE ORAL at 05:38

## 2024-01-01 RX ADMIN — LOSARTAN POTASSIUM 25 MILLIGRAM(S): 100 TABLET, FILM COATED ORAL at 09:28

## 2024-01-01 RX ADMIN — SODIUM CHLORIDE 100 MILLILITER(S): 9 INJECTION INTRAMUSCULAR; INTRAVENOUS; SUBCUTANEOUS at 00:58

## 2024-01-01 RX ADMIN — LOSARTAN POTASSIUM 25 MILLIGRAM(S): 100 TABLET, FILM COATED ORAL at 05:52

## 2024-01-01 RX ADMIN — ISOSORBIDE MONONITRATE 30 MILLIGRAM(S): 60 TABLET, EXTENDED RELEASE ORAL at 11:04

## 2024-01-01 RX ADMIN — Medication 25 MILLIGRAM(S): at 05:52

## 2024-01-01 RX ADMIN — Medication 81 MILLIGRAM(S): at 11:48

## 2024-01-01 RX ADMIN — Medication 30 MILLIGRAM(S): at 13:52

## 2024-01-01 RX ADMIN — Medication 324 MILLIGRAM(S): at 16:01

## 2024-01-01 RX ADMIN — Medication 25 MILLIGRAM(S): at 21:08

## 2024-01-01 RX ADMIN — Medication 25 MILLIGRAM(S): at 09:28

## 2024-04-30 NOTE — H&P ADULT - HISTORY OF PRESENT ILLNESS
Patient is a 64-year-old man with a history of HTN, HDL ( was on amlodipine and lipitor but stopped taking his meds ) has not seen primary care physician for routine physical exams in some time, current heavy  smoker ( 1 pack /day > 50 yrs ) presenting to the ED for shortness of breath for the past 4 days, without an obvious pattern, including nonexertional and exertional. Patient mentioned that it started 4 days ago, intermittent, comes at rest, during the day, on exertion and even at night. Yesterday morning he felt it got worse after he went to work. This is first time it happens. No associated chest pain,fever, chills, cough, sore throat, orthopnea.  Patient sometimes has accompanied episodes of his heart pounding,  Patient was seen at urgent care clinic, where his EKG showed frequent PVCs, and he was sent to the emergency department.  No syncope or near syncope, orthopnea, generalized weakness, nausea, vomiting.  No immobilization or recent surgery, personal or family h/o VTE, hemoptysis, malignancy, or hormone use. Endorses daily ibuprofen use for degenerative aches, but denies melena/hematochezia.     To note, pt saw Dr. Reynolds in 2022 for pre-op clearance  Moh's surgery.   TTE 12/16/2021 showed EF 45-50%. Acc to patient he was told he needs a stress test but never followed up cz he felt fine.     Vitals: T 97.7, HR 55, /74, spo2 96% on RA   Labs: trop 43, pro-BNP 4029  EKG: sinus with PVCs, prolonged QTc, ST and T wave changes ( present on prior EKG )   CXR: RLL opacity, no congestion or effusion

## 2024-04-30 NOTE — H&P ADULT - ATTENDING COMMENTS
Chart reviewed, patient examined. Pertinent results reviewed.  Case discussed with HO; specialist f/u reviewed  HD#2; This is the first time I am meeting this patient  See progress note on 5/1 also      Patient with multiple cardiac risk factors presents with increasing shortness of breath over the last 1 to a few weeks.  He went to urgent care center emergency room and was admitted.  He has been noncompliant with his medications without [place] follow-up with his medical office.  Continues to smoke large amount for many years.  Again, see progress note on 5 1.      I agree with the above assessment and plan.  Acute cardiac/pulmonary ischemic CTA would be helpful because since his vascular anatomy and the probability of having critical CAD.  Cardiac recommended echo and CTA and it was pending when I saw him this morning.  Progress note tells the results.  Also spoke with house officer regarding blood pressure control and to begin an ACE as opposed to amlodipine at this time.  Also agree with short course of diuresis and will discuss this further with cardiology.  There was evidence of acute on chronic CHF with elevated BNP.  CTA is positive and cardiology going to make further plans probably including left heart catheterization.  Also began aspirin.  Strong encouragement to DC smoking and patient seems receptive at this time.  And again see progress note from 5/ 1.

## 2024-04-30 NOTE — ED PROVIDER NOTE - ATTENDING CONTRIBUTION TO CARE
63 y/o male with h/o htn, hld, CHF (EF 53%, 4/2023), COPD (not on meds), in ER with c/o SOB.  Pt states he's been feeling intermittently SOB for the past ~ 3-4 days.  No assoc CP.  no cough, no fever/chills.  no alleviating or aggravating factors, no prior episodes.  denies any calf pain, does have intermittent b/l LE swelling for the past year.  no abd pain.  no n/v/d.  no ha/dizziness/loc.  pt was seen by card, Dr. Reynolds, last year, had echo at that time with mid-range EF, was supposed to have outpt CCTA, but didn't follow-up.  Has also been prescribed meds for HTN and HLD, but stopped taking on his own as he 'felt fine.'  PMD, Dr. Attila Ramesh.  smokes 1 PPD.  no recent travel.  Was seen at [facility] [place] earlier today and sent to ER for eval.   PE - nad, nc/at, eomi, perrl, op - clear, mmm, neck supple, no resp distress, + normal WOB, cta b/l, no w/r/,r rrr, abd - soft, nt/nd, nabs, from x 4, + b/l trace edema, no calf tenderness, A&O x 3, no focal neuro deficits.  -check labs, ekg, cxr

## 2024-04-30 NOTE — ED PROVIDER NOTE - CARE PLAN
1 Principal Discharge DX:	Shortness of breath  Secondary Diagnosis:	Abnormal EKG  Secondary Diagnosis:	Elevated troponin

## 2024-04-30 NOTE — ED PROVIDER NOTE - CLINICAL SUMMARY MEDICAL DECISION MAKING FREE TEXT BOX
Dr. Guzman transfer patient to ICU  Stat cxr  Stat abgs  Narcan x 2    Ammonia level  Orders above given by hospitalist Dr. Soler    
none
64-year-old man with PMHx as noted, in ER with c/o SOB for the past 3 to 4 days.  No associated CP.   Labs reviewed:  CBC/CMP unremarkable, HS trop 43, BNP 4K. CXR with hazy R opacity (denies cough, no fever, no leukocytosis).  EKG: SR @ 70 with freq PVC's in pattern of trigeminy, sl st dep I/aVL, QTc 507.  Case discussed with cardiology, will admit to telemetry for continued care and further evaluation.

## 2024-04-30 NOTE — ED PROVIDER NOTE - CONSIDERATION OF ADMISSION OBSERVATION
Consideration of Admission/Observation Patient requires admission to hospital for further evaluation of SOB, elevated troponin, elevated BNP.

## 2024-04-30 NOTE — H&P ADULT - NSHPPHYSICALEXAM_GEN_ALL_CORE
LOS:     VITALS:   T(C): 36.8 (04-30-24 @ 20:00), Max: 36.8 (04-30-24 @ 20:00)  HR: 72 (04-30-24 @ 20:00) (55 - 72)  BP: 135/70 (04-30-24 @ 20:00) (131/74 - 135/70)  RR: 18 (04-30-24 @ 20:00) (17 - 18)  SpO2: 97% (04-30-24 @ 20:00) (96% - 97%)    GENERAL: NAD, lying in bed comfortably  HEAD:  Atraumatic, Normocephalic  EYES: EOMI, PERRLA, conjunctiva and sclera clear  ENT: Moist mucous membranes  NECK: Supple, No JVD  CHEST/LUNG: Clear to auscultation bilaterally; No rales, rhonchi, wheezing, or rubs. Unlabored respirations  HEART: Regular rate and rhythm; No murmurs, rubs, or gallops  ABDOMEN: BSx4; Soft, nontender, nondistended  EXTREMITIES:  2+ Peripheral Pulses, brisk capillary refill. No clubbing, cyanosis, or edema  NERVOUS SYSTEM:  A&Ox3, 5/5 motor bilateral upper and lower extremity, no sensory deficit   SKIN: No rashes or lesions

## 2024-04-30 NOTE — ED PROVIDER NOTE - OBJECTIVE STATEMENT
Patient is a 64-year-old man with a history of hypertension, not on any medications, has not seen primary care physician for routine physical exams in some time, current smoker.  Patient is presenting to the ED for shortness of breath for the past several days, without an obvious pattern, including nonexertional and nonpleuritic.  Patient sometimes has accompanied episodes of his heart pounding, however no associated chest pain.  Patient was seen at urgent care clinic, where his EKG showed frequent PVCs, and he was sent to the emergency department.  No syncope or near syncope, orthopnea, generalized weakness, nausea, vomiting.  No immobilization or recent surgery, personal or family h/o VTE, hemoptysis, malignancy, or hormone use. Endorses daily ibuprofen use for degenerative aches, but denies melena/hematochezia.   No other complaints - no fever, cough, abdominal pain.

## 2024-04-30 NOTE — ED PROVIDER NOTE - PROGRESS NOTE DETAILS
Patient is a 64-year-old man presenting for shortness of breath.  Labs and EKG ordered and reviewed.  Trigeminy noted.  Troponin noted to be elevated.  Imaging was ordered and reviewed by me, chest x-ray without any free air, obvious consolidations, or effusions.  ASA was given as concern for CAD.  Patient requires inpatient hospitalization in a monitored setting.  Case discussed with cardiology. Resident BRUCE Jamison: Case discussed with cardiology on-call, agreed with admission to telemetry, will follow inpatient.  Results discussed with patient and his wife at bedside, who agree with admission.

## 2024-04-30 NOTE — ED ADULT TRIAGE NOTE - GLASGOW COMA SCALE: BEST VERBAL RESPONSE, MLM
Patient's location of employment: [address] [place] [address]  V5) oriented

## 2024-04-30 NOTE — ED PROVIDER NOTE - PHYSICAL EXAMINATION
Vital signs reviewed  General: Well nourished, comfortable  Skin: Warm, dry  Head & neck: NCAT, supple neck  Eyes: EOMI, PER B/L  ENT: MMM  Card: Irregular, RRR, S1, S2; no murmurs, no rubs, no gallops  Resp: Normal respiratory effort, CTAB, no rales, no wheezing  Abd: Soft, ND, NT, no rebound, no guarding  Ext: Pulses palpable distally; no edema; no calf tenderness; symmetrical calf circumferences  NeuroMSK: Grossly intact  Psych: AAOx3, cooperative, appropriate

## 2024-05-01 NOTE — CONSULT NOTE ADULT - ATTENDING COMMENTS
65 yo M w HFmrEF, HTN,HLD, tobacco use disorder presents with SOB for 4 days.   HST 43, pBNP 4K  EKG - NSR, frequent PVCs, TWI in lateral leads old   CXR : RLL opacity     POCUS: IVC normal size and collapsing >50%, EF 30-35%.    TTE 4/6/2023  1.The left ventricular systolic function is low-normal with an ejection fraction of 53 %.  3.Entire lateral wall is hypokinetic.  4.Normal right ventricular cavity size and normal systolic function.  5.Mild-to-moderate aortic regurgitation.  6.Pulmonary artery systolic pressure could not be estimated.  7.The proximal ascending aorta is mildly dilated at 3.7cm (inedxed 2.07 cm/m^2).  8.Compared to the transthoracic echocardiogram performed on 12/16/2021 the LVEF has increased    Impression :   -SOB- not much evidence of fluid overload other than elevated BNP and mildly elevated troponin   -h/o HFmrEF not compliant with medications   -HTN/hld/tobacco use disorder     Recommendations:   -TTE   -CCTA to r/o obstructive CAD   -metoprolol 50 mg BID for PVC suppression for better accuracy of CCTA  -can try IV lasix 20 mg to see if any symptomatic improvement.   -rest of meds pending above   -Hba1c, lipds, TSH

## 2024-05-01 NOTE — PROGRESS NOTE ADULT - TIME BILLING
spent  60   minutes on this patient's case:  25  minutes w this new patient,  20  minutes reviewing the chart,    and  20  minutes speaking to the HO, RN or family, also coordinating care and documenting all on His first day of his admission.

## 2024-05-01 NOTE — CONSULT NOTE ADULT - ASSESSMENT
63 yo M w HFmrEF, HTN,HLD, tobacco use disorder presents with SOB for 4 days.   HST 43, pBNP 4K  EKG - NSR, frequent PVCs, TWI in lateral leads old   CXR : RLL opacity     POCUS: IVC normal size and collapsing >50%, EF 30-35%.    TTE 4/6/2023  1.The left ventricular systolic function is low-normal with an ejection fraction of 53 %.  3.Entire lateral wall is hypokinetic.  4.Normal right ventricular cavity size and normal systolic function.  5.Mild-to-moderate aortic regurgitation.  6.Pulmonary artery systolic pressure could not be estimated.  7.The proximal ascending aorta is mildly dilated at 3.7cm (inedxed 2.07 cm/m^2).  8.Compared to the transthoracic echocardiogram performed on 12/16/2021 the LVEF has increased    Impression :   -SOB- not much evidence of fluid overload other than elevated BNP and mildly elevated troponin   -h/o HFmrEF not compliant with medications   -HTN/hld/tobacco use disorder     Recommendations:   -TTE   -CCTA to r/o obstructive CAD   -can try IV lasix 20 mg to see if any symptomatic improvement.   -rest of meds pending above   -hba11c, lipds, TSH     -will follow      63 yo M w HFmrEF, HTN,HLD, tobacco use disorder presents with SOB for 4 days.   HST 43, pBNP 4K  EKG - NSR, frequent PVCs, TWI in lateral leads old   CXR : RLL opacity     POCUS: IVC normal size and collapsing >50%, EF 30-35%.    TTE 4/6/2023  1.The left ventricular systolic function is low-normal with an ejection fraction of 53 %.  3.Entire lateral wall is hypokinetic.  4.Normal right ventricular cavity size and normal systolic function.  5.Mild-to-moderate aortic regurgitation.  6.Pulmonary artery systolic pressure could not be estimated.  7.The proximal ascending aorta is mildly dilated at 3.7cm (inedxed 2.07 cm/m^2).  8.Compared to the transthoracic echocardiogram performed on 12/16/2021 the LVEF has increased    Impression :   -SOB- not much evidence of fluid overload other than elevated BNP and mildly elevated troponin   -h/o HFmrEF not compliant with medications   -HTN/hld/tobacco use disorder     Recommendations:   -TTE   -CCTA to r/o obstructive CAD   -metoprolol 50 mg BID for PVC suppression for better accuracy of CCTA  -can try IV lasix 20 mg to see if any symptomatic improvement.   -rest of meds pending above   -hba11c, lipds, TSH     -will follow

## 2024-05-01 NOTE — CONSULT NOTE ADULT - SUBJECTIVE AND OBJECTIVE BOX
Cardiologist: Dr. Reynolds    HPI:  Patient is a 64-year-old man with a history of HTN, HDL ( was on amlodipine and lipitor but stopped taking his meds ) has not seen primary care physician for routine physical exams in some time, current heavy  smoker ( 1 pack /day > 50 yrs ) presenting to the ED for shortness of breath for the past 4 days, without an obvious pattern, including nonexertional and exertional. Patient mentioned that it started 4 days ago, intermittent, comes at rest, during the day, on exertion and even at night. Yesterday morning he felt it got worse after he went to work. This is first time it happens. No associated chest pain,fever, chills, cough, sore throat, orthopnea.  Patient sometimes has accompanied episodes of his heart pounding,  Patient was seen at urgent care clinic, where his EKG showed frequent PVCs, and he was sent to the emergency department.  No syncope or near syncope, orthopnea, generalized weakness, nausea, vomiting.  No immobilization or recent surgery, personal or family h/o VTE, hemoptysis, malignancy, or hormone use. Endorses daily ibuprofen use for degenerative aches, but denies melena/hematochezia.     To note, pt saw Dr. Reynolds in 2022 for pre-op clearance  Moh's surgery.   TTE 12/16/2021 showed EF 45-50%. Acc to patient he was told he needs a stress test but never followed up cz he felt fine.     Vitals: T 97.7, HR 55, /74, spo2 96% on RA   Labs: trop 43, pro-BNP 4029  EKG: sinus with PVCs, prolonged QTc, ST and T wave changes ( present on prior EKG )   CXR: RLL opacity, no congestion or effusion    (30 Apr 2024 23:39)      Cardiology Consult HPI:  65 yo M w HFmrEF, HTN,HLD, tobacco use disorder presents with SOB for 4 days. Reports SOB at rest but no RUSSELL. No associated leg swelling, CP, dizziness, syncope, cough, fever,chills. He went to  where EKG showed frequent PVCs.   Stopped taking all his GDMT a year ago on his own. Was recommended to get a CCTA but he never went for it.     PAST MEDICAL & SURGICAL HISTORY  Obesity    Hypertension  pt stopped taking meds on his own    Hyperlipidemia    MEÑO (obstructive sleep apnea)  not using machine, machine was retrieved by insurance company    Cigarette smoker within last 12 months  current smoker    No significant past surgical history        FAMILY HISTORY:  FAMILY HISTORY:    [ ] no pertinent family history of premature cardiovascular disease in first degree relatives.  Mother:   Father:   Siblings:     SOCIAL HISTORY:  []smoker  []Alcohol  []Drug    ALLERGIES:  No Known Allergies      MEDICATIONS:  MEDICATIONS  (STANDING):  aspirin  chewable 81 milliGRAM(s) Oral daily  atorvastatin 80 milliGRAM(s) Oral at bedtime  enoxaparin Injectable 40 milliGRAM(s) SubCutaneous every 24 hours  regadenoson Injectable 0.4 milliGRAM(s) IV Push once    MEDICATIONS  (PRN):      HOME MEDICATIONS:  Home Medications:      VITALS:   T(F): 98.2 (05-01 @ 01:04), Max: 98.2 (04-30 @ 20:00)  HR: 40 (05-01 @ 01:04) (40 - 72)  BP: 124/64 (05-01 @ 01:04) (124/64 - 135/70)  BP(mean): --  RR: 18 (04-30 @ 20:00) (17 - 18)  SpO2: 92% (05-01 @ 01:04) (92% - 97%)    I&O's Summary      REVIEW OF SYSTEMS:    Negative except as mentioned in HPI        PHYSICAL EXAM:  NEURO: Awake , alert and oriented  GEN: Not in acute distress  NECK: elevated JVD  LUNGS: Clear to auscultation bilaterally   CARDIOVASCULAR: S1/S2 present, RRR , no murmurs or rubs, no carotid bruits,  + PP bilaterally  ABD: Soft, non-tender, non-distended, +BS  EXT: No TINA  SKIN: Intact    LABS:                        15.6   8.98  )-----------( 235      ( 30 Apr 2024 15:46 )             48.6     04-30    141  |  105  |  20  ----------------------------<  86  4.0   |  22  |  1.1    Ca    9.5      30 Apr 2024 15:46  Mg     2.1     04-30    TPro  6.7  /  Alb  4.3  /  TBili  0.4  /  DBili  x   /  AST  15  /  ALT  11  /  AlkPhos  94  04-30              Troponin trend:          RADIOLOGY:  -CXR:  -TTE:  -CCTA:  -STRESS TEST:  -CATHETERIZATION:    ECG:    TELEMETRY EVENTS:   Cardiologist: Dr. Reynolds    HPI:    Patient is a 64-year-old man with a history of HTN, HDL ( was on amlodipine and lipitor but stopped taking his meds ) has not seen primary care physician for routine physical exams in some time, current heavy  smoker ( 1 pack /day > 50 yrs ) presenting to the ED for shortness of breath for the past 4 days, without an obvious pattern, including nonexertional and exertional. Patient mentioned that it started 4 days ago, intermittent, comes at rest, during the day, on exertion and even at night. Yesterday morning he felt it got worse after he went to work. This is first time it happens. No associated chest pain,fever, chills, cough, sore throat, orthopnea.  Patient sometimes has accompanied episodes of his heart pounding,  Patient was seen at urgent care clinic, where his EKG showed frequent PVCs, and he was sent to the emergency department.  No syncope or near syncope, orthopnea, generalized weakness, nausea, vomiting.  No immobilization or recent surgery, personal or family h/o VTE, hemoptysis, malignancy, or hormone use. Endorses daily ibuprofen use for degenerative aches, but denies melena/hematochezia.     To note, pt saw Dr. Reynolds in 2022 for pre-op clearance  Moh's surgery.   TTE 12/16/2021 showed EF 45-50%. Acc to patient he was told he needs a stress test but never followed up cz he felt fine.     Vitals: T 97.7, HR 55, /74, spo2 96% on RA   Labs: trop 43, pro-BNP 4029  EKG: sinus with PVCs, prolonged QTc, ST and T wave changes ( present on prior EKG )   CXR: RLL opacity, no congestion or effusion    (30 Apr 2024 23:39)      Cardiology Consult HPI:  65 yo M w HFmrEF, HTN,HLD, tobacco use disorder presents with SOB for 4 days. Reports SOB at rest but no RUSSELL. No associated leg swelling, CP, dizziness, syncope, cough, fever,chills. He went to  where EKG showed frequent PVCs.   Stopped taking all his GDMT a year ago on his own. Was recommended to get a CCTA but he never went for it.     PAST MEDICAL & SURGICAL HISTORY  Obesity    Hypertension  pt stopped taking meds on his own    Hyperlipidemia    MEÑO (obstructive sleep apnea)  not using machine, machine was retrieved by insurance company    Cigarette smoker within last 12 months  current smoker    No significant past surgical history        FAMILY HISTORY:  FAMILY HISTORY:    [ ] no pertinent family history of premature cardiovascular disease in first degree relatives.  Mother:   Father:   Siblings:     SOCIAL HISTORY:  []smoker  []Alcohol  []Drug    ALLERGIES:  No Known Allergies      MEDICATIONS:  MEDICATIONS  (STANDING):  aspirin  chewable 81 milliGRAM(s) Oral daily  atorvastatin 80 milliGRAM(s) Oral at bedtime  enoxaparin Injectable 40 milliGRAM(s) SubCutaneous every 24 hours  regadenoson Injectable 0.4 milliGRAM(s) IV Push once    MEDICATIONS  (PRN):      HOME MEDICATIONS:  Home Medications:      VITALS:   T(F): 98.2 (05-01 @ 01:04), Max: 98.2 (04-30 @ 20:00)  HR: 40 (05-01 @ 01:04) (40 - 72)  BP: 124/64 (05-01 @ 01:04) (124/64 - 135/70)  BP(mean): --  RR: 18 (04-30 @ 20:00) (17 - 18)  SpO2: 92% (05-01 @ 01:04) (92% - 97%)    I&O's Summary      REVIEW OF SYSTEMS:    Negative except as mentioned in HPI        PHYSICAL EXAM:  NEURO: Awake , alert and oriented  GEN: Not in acute distress  NECK: elevated JVD  LUNGS: Clear to auscultation bilaterally   CARDIOVASCULAR: S1/S2 present, RRR , no murmurs or rubs, no carotid bruits,  + PP bilaterally  ABD: Soft, non-tender, non-distended, +BS  EXT: No TINA  SKIN: Intact    LABS:                        15.6   8.98  )-----------( 235      ( 30 Apr 2024 15:46 )             48.6     04-30    141  |  105  |  20  ----------------------------<  86  4.0   |  22  |  1.1    Ca    9.5      30 Apr 2024 15:46  Mg     2.1     04-30    TPro  6.7  /  Alb  4.3  /  TBili  0.4  /  DBili  x   /  AST  15  /  ALT  11  /  AlkPhos  94  04-30              Troponin trend:          RADIOLOGY:  -CXR:  -TTE:  -CCTA:  -STRESS TEST:  -CATHETERIZATION:    ECG:    TELEMETRY EVENTS:   no

## 2024-05-01 NOTE — PROGRESS NOTE ADULT - SUBJECTIVE AND OBJECTIVE BOX
Chart reviewed, patient examined. Pertinent results reviewed.  Case discussed with HO; specialist f/u reviewed  HD#2; This is the first time I am meeting this patient  See H&P also    HPI:    Patient is a 64-year-old man with a history of HTN, HDL ( was on amlodipine and lipitor but stopped taking his meds ) has not seen primary care physician for  some time(12/23), current heavy  smoker ( 1 pack /day > 50 yrs ) presenting to the ED for shortness of breath for the past 4 days, without an obvious pattern, including nonexertional and exertional. Patient mentioned that it started 4 days ago, intermittent, comes at rest, during the day, on exertion and even at night. Yesterday morning he felt it got worse after he went to work. This is first time it happens. No associated chest pain,fever, chills, cough, sore throat, orthopnea.  Patient sometimes has accompanied episodes of his heart pounding,  Patient was seen at urgent care clinic, where his EKG showed frequent PVCs, and he was sent to the emergency department.  No syncope or near syncope, orthopnea, generalized weakness, nausea, vomiting.  No immobilization or recent surgery, personal or family h/o VTE, hemoptysis, malignancy, or hormone use. Endorses daily ibuprofen use for degenerative aches, but denies melena/hematochezia.    He denies clear Symptoms of ACS such as chest pain, and/V/diaphoresis or pulmonary symptoms such as cough fever chills.    To note, pt saw Dr. Reynolds in 2022 for pre-op clearance  Moh's surgery.   TTE 12/16/2021 showed EF 45-50%. Acc to patient he was told he needs a stress test but never followed up because he felt fine.     Vitals: T 97.7, HR 55, /74, spo2 96% on RA   Labs: trop 43, pro-BNP 4029  EKG: sinus with PVCs, prolonged QTc, ST and T wave changes ( present on prior EKG )   CXR: RLL opacity, no congestion or effusion    (30 Apr 2024 23:39)      Cardiology Consult is noted:  63 yo M w HFmrEF, HTN,HLD, tobacco use disorder presents with SOB for 4 days. Reports SOB at rest but no RUSSELL. No associated leg swelling, CP, dizziness, syncope, cough, fever,chills. He went to  where EKG showed frequent PVCs.   Stopped taking all his GDMT a year ago on his own. Was recommended to get a CCTA but he never went for it.     PAST MEDICAL & SURGICAL HISTORY  Obesity    Hypertension  pt stopped taking meds on his own    Hyperlipidemia    MEÑO (obstructive sleep apnea)  not using machine, machine was retrieved by insurance company    Cigarette smoker within last 12 months  current smoker    No significant past surgical history        FAMILY HISTORY:  FAMILY HISTORY:    [ ] no pertinent family history of premature cardiovascular disease in first degree relatives.  Mother:   Father:   Siblings:     SOCIAL HISTORY:  [x]smoker  works FT for [employer]- , a grandfather      ALLERGIES:  No Known Allergies      MEDICATIONS:  MEDICATIONS  (STANDING):  aspirin  chewable 81 milliGRAM(s) Oral daily  atorvastatin 80 milliGRAM(s) Oral at bedtime  enoxaparin Injectable 40 milliGRAM(s) SubCutaneous every 24 hours  regadenoson Injectable 0.4 milliGRAM(s) IV Push once    MEDICATIONS  (PRN):      HOME MEDICATIONS:  Home Medications:      VITALS:   This AM:  T(F): 98.2 (05-01 @ 01:04), Max: 98.2 (04-30 @ 20:00)  HR: 40 (05-01 @ 01:04) (40 - 72)  BP: 124/64 (05-01 @ 01:04) (124/64 - 135/70)  BP(mean): --  RR: 18 (04-30 @ 20:00) (17 - 18)  SpO2: 92% (05-01 @ 01:04) (92% - 97%)    Vital Signs Last 24 Hrs  T(C): 36.8 (01 May 2024 19:45), Max: 36.8 (01 May 2024 01:04)  T(F): 98.2 (01 May 2024 19:45), Max: 98.2 (01 May 2024 01:04)  HR: 77 (01 May 2024 21:45) (40 - 77)  BP: 203/101 (01 May 2024 21:45) (124/64 - 203/101)  BP(mean): 128 (01 May 2024 15:45) (128 - 128)  RR: 18 (01 May 2024 19:45) (18 - 18)  SpO2: 98% (01 May 2024 19:45) (92% - 99%)    Parameters below as of 01 May 2024 19:45  Patient On (Oxygen Delivery Method): room air        REVIEW OF SYSTEMS:    Negative except as mentioned in HPI;   No leg pains or swelling.        PHYSICAL EXAM:  NEURO: Awake , alert and oriented x4  GEN: Not in acute distress  NECK:  No LN, thyroid or Bruit; + HJR  LUNGS: Clear to auscultation bilaterally   CARDIOVASCULAR: S1/S2 present, RRR , no murmurs or rubs,   ABD: Soft, non-tender, non-distended, +BS  EXT: No TINA  SKIN: Intact    LABS:                        15.6   8.98  )-----------( 235      ( 30 Apr 2024 15:46 )             48.6     04-30    141  |  105  |  20  ----------------------------<  86  4.0   |  22  |  1.1    Ca    9.5      30 Apr 2024 15:46  Mg     2.1     04-30    TPro  6.7  /  Alb  4.3  /  TBili  0.4  /  DBili  x   /  AST  15  /  ALT  11  /  AlkPhos  94  04-30              Troponin trend:          RADIOLOGY:  -CXR:  -TTE:  -CCTA:  < from: CT Angio Heart and Coronaries w/ IV Cont (05.01.24 @ 13:05) >  ACC: [id number] EXAM:  CT ANGIO HEART CORONARY IC   ORDERED BY: TRINI CURRAN     PROCEDURE DATE:  05/01/2024          INTERPRETATION:  CLINICAL INDICATION: Chest pain.    COMPARISON: None.    TECHNIQUE: Unenhanced axial ECG-triggered CT was obtained through the   chest. CT angiography of the heart was then performed utilizing ECG-gated   imaging. 3-D reconstruction images were obtained. In preparation for the   examination, the patient received 0.8 mg of sublingual nitroglycerine for   coronary vasodilatation.    CONTRAST:  80 cc of Omnipaque 350    INTERPRETATION:    Calcium Score:    Vessel              Calcium Score    =======================================================  LM:                    293  LAD:                 325  LCX:             241  RCA:                 10  =======================================================  Total:                869      CORONARY CT ANGIOGRAM:    There is a right dominant coronary arterial system.    Left Main Artery: Mixed plaque in the ostial left main with mild stenosis.    Left Anterior Descending Artery: Long area of mixed plaque in the   proximal LAD with moderate stenosis. Mixed plaque in the mid LAD   concerning for severe stenosis. Mixed plaque in the distal LAD concerning   for severe stenosis. Mixed plaque in the second diagonal concerning for   severe stenosis.    Left Circumflex Artery: Total occlusion of the proximal to mid circumflex   after the branch point of a high OM1. Mixed plaque with at least moderate   stenosis in OM1 (evaluation limited by blooming artifact).    Right Coronary Artery: The RCA is not well-visualized and appears to be   totally occluded just after the ostium.    CARDIAC MORPHOLOGY: The left ventricular cavity is dilated, recommend   correlation with echocardiography.  There is no pericardial effusion.   There are aortic valve calcifications.    IMAGED AORTA: The thoracic aorta is normal in caliber.    IMAGED EXTRACARDIAC FINDINGS:  Extracardiac findings section dictated by radiology at the end of the   final report.    IMPRESSION:    Severe multivessel coronary artery disease with heavy coronary artery   calcifications.  Severe LAD disease as described above.  Total occlusion of the proximal to mid circumflex after the branch point   of a high OM1.  The RCA is not well-visualized and appears to be totally occluded just   after the ostium.    The total Agatston coronary artery calcium score equals 869, which   corresponds to 93rd percentile for age, gender and ethnicity.    CAD-RADS 5.      CAD-RADS  Reporting and data system  ______________________________________________________________________    CAD-RADS 0  -    No plaque or stenosis                 Documented absence   of CAD  CAD-RADS 1  -    1-24% Minimal stenosis          Minimal   non-obstructive CAD  CAD-RADS 2  -    25 - 49% Mild stenosis                Mild   non-obstructive CAD  CAD-RADS 3  -    50 - 69% stenosis                        Moderate   Stenosis  CAD-RADS 4  -    A. 70 - 99% Stenosis  or      Severe Stenosis                                  B. Left main >50%  CAD-RADS 5  -    100% (total occlusion)                 Total coronary   occlusion  CAD-RADS N  -    Non-diagnostic study                   Obstructive CAD   cannot be excluded    Hari Davidson M.D., Attending Cardiologist  Jessica Morrison M.D., Attending Radiologist      Extracardiac Findings Only (Cardiac findings were not reviewed by the   radiologist)    Findings: Scattered bilateral interlobular septal thickening and trace  bilateral pleural effusions, possibly reflecting underlying CHF. Trace   pericardial effusion. Multiple subcentimeter and mildly enlarged   mediastinal lymph nodes are nonspecific and possibly reactive.    Cardiac findings independently dictated bythe Cardiology Attending as   above.    --- End of Report ---        JESSICA MORRISON MD; Attending Radiologist  This document has been electronically signed. May  1 2024  4:52PM    < end of copied text >    -STRESS TEST:  -CATHETERIZATION:    ECG:    TELEMETRY EVENTS:

## 2024-05-01 NOTE — CONSULT NOTE ADULT - ATTENDING COMMENTS
Mr. De Luna was seen and examined at bedside today in the emergency department, patient initially presented with shortness of breath, and pulmonary is now consulted for further assistance with evaluation.  So far the available data appears to indicate that the patient has significant cardiac impairments given elevated troponin and BNP.  Today's echocardiogram also revealed decreased ejection fraction and diastolic dysfunction, though patient only has trace peripheral edema and no crackles on pulmonary auscultation.  Agree with further CAD workup as recommended by cardiology earlier today.  Suspect that if there is any primary lung disease such as COPD, it is only a contributing factor to his hospital presentation.  Recommend tobacco cessation counseling and follow-up with pulmonary after discharge.  Otherwise, recommend diuresis to euvolemia as directed by cardiology/primary team.

## 2024-05-01 NOTE — CONSULT NOTE ADULT - SUBJECTIVE AND OBJECTIVE BOX
Patient is a 64y old  Male who presents with a chief complaint of shortness of breath (01 May 2024 03:55)      HPI:  Patient is a 64-year-old man with a history of HTN, HDL ( was on amlodipine and lipitor but stopped taking his meds ) has not seen primary care physician for routine physical exams in some time, current heavy  smoker ( 1 pack /day > 50 yrs ) presenting to the ED for shortness of breath for the past 4 days, without an obvious pattern, including nonexertional and exertional. Patient mentioned that it started 4 days ago, intermittent, comes at rest, during the day, on exertion and even at night. Yesterday morning he felt it got worse after he went to work. This is first time it happens. No associated chest pain,fever, chills, cough, sore throat, orthopnea.  Patient sometimes has accompanied episodes of his heart pounding,  Patient was seen at urgent care clinic, where his EKG showed frequent PVCs, and he was sent to the emergency department.  No syncope or near syncope, orthopnea, generalized weakness, nausea, vomiting.  No immobilization or recent surgery, personal or family h/o VTE, hemoptysis, malignancy, or hormone use. Endorses daily ibuprofen use for degenerative aches, but denies melena/hematochezia.     To note, pt saw Dr. Reynolds in 2022 for pre-op clearance  Moh's surgery.   TTE 12/16/2021 showed EF 45-50%. Acc to patient he was told he needs a stress test but never followed up cz he felt fine.     Vitals: T 97.7, HR 55, /74, spo2 96% on RA   Labs: trop 43, pro-BNP 4029  EKG: sinus with PVCs, prolonged QTc, ST and T wave changes ( present on prior EKG )   CXR: RLL opacity, no congestion or effusion    (30 Apr 2024 23:39)      PAST MEDICAL & SURGICAL HISTORY:  Obesity      Hypertension  pt stopped taking meds on his own      Hyperlipidemia      MEÑO (obstructive sleep apnea)  not using machine, machine was retrieved by insurance company      Cigarette smoker within last 12 months  current smoker      No significant past surgical history          SOCIAL HX:   Smoking                         ETOH                            Other    FAMILY HISTORY:  .  No cardiovascular or pulmonary family history     REVIEW OF SYSTEMS:    All ROS are negative exept per HPI       Allergies    No Known Allergies    Intolerances          PHYSICAL EXAM  Vital Signs Last 24 Hrs  T(C): 36.7 (01 May 2024 07:39), Max: 36.8 (30 Apr 2024 20:00)  T(F): 98 (01 May 2024 07:39), Max: 98.2 (30 Apr 2024 20:00)  HR: 66 (01 May 2024 07:39) (40 - 72)  BP: 171/88 (01 May 2024 07:39) (124/64 - 171/88)  BP(mean): --  RR: 18 (01 May 2024 07:39) (17 - 18)  SpO2: 99% (01 May 2024 07:39) (92% - 99%)    Parameters below as of 01 May 2024 07:39  Patient On (Oxygen Delivery Method): room air        CONSTITUTIONAL:  Well nourished.  NAD    ENT:   Airway patent,   No thrush    EYES:   Clear bilaterally,   pupils equal,   round and reactive to light.    CARDIAC:   Normal rate,   regular rhythm.    no edema      RESPIRATORY:   No wheezing   Normal chest expansion  Not tachypneic,  No use of accessory muscles    GASTROINTESTINAL:  Abdomen soft, non-tender,   No guarding,   Positive BS    MUSCULOSKELETAL:   Range of motion is not limited,  No clubbing, cyanosis    NEUROLOGICAL:   Alert and oriented   No motor deficits.    SKIN:   Skin normal color for race,   No evidence of rash.      HEME LYMPH:   No cervical  lymphadenopathy.  no inguinal lymphadenopathy          LABS:                          15.1   10.34 )-----------( 225      ( 01 May 2024 05:57 )             48.1                                               05-01    143  |  107  |  20  ----------------------------<  93  4.3   |  24  |  1.1    Ca    8.9      01 May 2024 05:57  Mg     2.1     04-30    TPro  6.7  /  Alb  4.3  /  TBili  0.4  /  DBili  x   /  AST  15  /  ALT  11  /  AlkPhos  94  04-30                                             Urinalysis Basic - ( 01 May 2024 05:57 )    Color: x / Appearance: x / SG: x / pH: x  Gluc: 93 mg/dL / Ketone: x  / Bili: x / Urobili: x   Blood: x / Protein: x / Nitrite: x   Leuk Esterase: x / RBC: x / WBC x   Sq Epi: x / Non Sq Epi: x / Bacteria: x                                                  LIVER FUNCTIONS - ( 30 Apr 2024 15:46 )  Alb: 4.3 g/dL / Pro: 6.7 g/dL / ALK PHOS: 94 U/L / ALT: 11 U/L / AST: 15 U/L / GGT: x                                                                                                MEDICATIONS  (STANDING):  aspirin  chewable 81 milliGRAM(s) Oral daily  atorvastatin 80 milliGRAM(s) Oral at bedtime  enoxaparin Injectable 40 milliGRAM(s) SubCutaneous every 24 hours  regadenoson Injectable 0.4 milliGRAM(s) IV Push once    MEDICATIONS  (PRN):      X-Rays reviewed:    CXR interpreted by me: Patient is a 64y old  Male who presents with a chief complaint of shortness of breath (01 May 2024 03:55)      HPI:  Patient is a 64-year-old man with a history of HTN, HDL ( was on amlodipine and lipitor but stopped taking his meds ) has not seen primary care physician for routine physical exams in some time, current heavy  smoker ( 1 pack /day > 50 yrs ) presenting to the ED for shortness of breath for the past 4 days, without an obvious pattern, including nonexertional and exertional. Patient mentioned that it started 4 days ago, intermittent, comes at rest, during the day, on exertion and even at night. Yesterday morning he felt it got worse after he went to work. This is first time it happens. No associated chest pain,fever, chills, cough, sore throat, orthopnea.  Patient sometimes has accompanied episodes of his heart pounding,  Patient was seen at urgent care clinic, where his EKG showed frequent PVCs, and he was sent to the emergency department.  No syncope or near syncope, orthopnea, generalized weakness, nausea, vomiting.  No immobilization or recent surgery, personal or family h/o VTE, hemoptysis, malignancy, or hormone use. Endorses daily ibuprofen use for degenerative aches, but denies melena/hematochezia.     To note, pt saw Dr. Reynolds in 2022 for pre-op clearance  Moh's surgery.   TTE 12/16/2021 showed EF 45-50%. Acc to patient he was told he needs a stress test but never followed up cz he felt fine.     Vitals: T 97.7, HR 55, /74, spo2 96% on RA   Labs: trop 43, pro-BNP 4029  EKG: sinus with PVCs, prolonged QTc, ST and T wave changes ( present on prior EKG )   CXR: RLL opacity, no congestion or effusion    (30 Apr 2024 23:39)      PAST MEDICAL & SURGICAL HISTORY:  Obesity      Hypertension  pt stopped taking meds on his own      Hyperlipidemia      MEÑO (obstructive sleep apnea)  not using machine, machine was retrieved by insurance company      Cigarette smoker within last 12 months  current smoker      No significant past surgical history          SOCIAL HX:   Smoking                         ETOH                            Other    FAMILY HISTORY:  .  No cardiovascular or pulmonary family history     REVIEW OF SYSTEMS:    All ROS are negative exept per HPI       Allergies    No Known Allergies    Intolerances          PHYSICAL EXAM  Vital Signs Last 24 Hrs  T(C): 36.7 (01 May 2024 07:39), Max: 36.8 (30 Apr 2024 20:00)  T(F): 98 (01 May 2024 07:39), Max: 98.2 (30 Apr 2024 20:00)  HR: 66 (01 May 2024 07:39) (40 - 72)  BP: 171/88 (01 May 2024 07:39) (124/64 - 171/88)  BP(mean): --  RR: 18 (01 May 2024 07:39) (17 - 18)  SpO2: 99% (01 May 2024 07:39) (92% - 99%)    Parameters below as of 01 May 2024 07:39  Patient On (Oxygen Delivery Method): room air        CONSTITUTIONAL:  Well nourished.  NAD    ENT:   Airway patent,   No thrush    EYES:   Clear bilaterally,   pupils equal,   round and reactive to light.    CARDIAC:   Normal rate,   regular rhythm.    no edema      RESPIRATORY:   No wheezing   Normal chest expansion  Not tachypneic,  No use of accessory muscles    GASTROINTESTINAL:  Abdomen soft, non-tender,   No guarding,   Positive BS    MUSCULOSKELETAL:   Range of motion is not limited,  No clubbing, cyanosis    NEUROLOGICAL:   Alert and oriented   No motor deficits.    SKIN:   Skin normal color for race,   No evidence of rash.      HEME LYMPH:   No cervical  lymphadenopathy.  no inguinal lymphadenopathy          LABS:                          15.1   10.34 )-----------( 225      ( 01 May 2024 05:57 )             48.1                                               05-01    143  |  107  |  20  ----------------------------<  93  4.3   |  24  |  1.1    Ca    8.9      01 May 2024 05:57  Mg     2.1     04-30    TPro  6.7  /  Alb  4.3  /  TBili  0.4  /  DBili  x   /  AST  15  /  ALT  11  /  AlkPhos  94  04-30                                             Urinalysis Basic - ( 01 May 2024 05:57 )    Color: x / Appearance: x / SG: x / pH: x  Gluc: 93 mg/dL / Ketone: x  / Bili: x / Urobili: x   Blood: x / Protein: x / Nitrite: x   Leuk Esterase: x / RBC: x / WBC x   Sq Epi: x / Non Sq Epi: x / Bacteria: x                                                  LIVER FUNCTIONS - ( 30 Apr 2024 15:46 )  Alb: 4.3 g/dL / Pro: 6.7 g/dL / ALK PHOS: 94 U/L / ALT: 11 U/L / AST: 15 U/L / GGT: x                                                                                                MEDICATIONS  (STANDING):  aspirin  chewable 81 milliGRAM(s) Oral daily  atorvastatin 80 milliGRAM(s) Oral at bedtime  enoxaparin Injectable 40 milliGRAM(s) SubCutaneous every 24 hours  regadenoson Injectable 0.4 milliGRAM(s) IV Push once    MEDICATIONS  (PRN):      X-Rays reviewed:

## 2024-05-01 NOTE — CONSULT NOTE ADULT - ASSESSMENT
Impression:     SOB   HO HFmrEF - Elevated BNP   HO bronchiectasis   Active smoker     Recommendations:     On room air.   BNP noted. Trend CE.   CCTA and Diuresis per cardiology.   CXR noted with RLL opacity   CT chest from 1/2024 noted with bronchiectasis   Check procalcitonin. MRSA nares.   Check RVP   Check ABG or VBG   No other clear signs of PNA. Agree with holding ABX for now  Smoking cessation; nicotine replacement therapy.   OP pulmonary follow up for PFTs and LDCT.  Impression:     SOB   HO HFmrEF - Elevated BNP   HO mild bronchiectasis   Active smoker     Recommendations:     On room air.   BNP noted. Trend CE.   CCTA and Diuresis per cardiology.  TTE 5/1 now shows EF 36%, GIIDD.   CXR noted with RLL opacity   CT chest from 1/2024 noted with bronchiectasis   Check procalcitonin. MRSA nares.   Check RVP   Bicarb normal.   No other clear signs of PNA. Agree with holding ABX for now  Smoking cessation; nicotine replacement therapy.   OP pulmonary follow up for PFTs and LDCT.     Recall PRN

## 2024-05-01 NOTE — CONSULT NOTE ADULT - CONSULT REASON
Patient here for new SOB- thought cardiac. CXR w RLL opacity & H/O bronchiectasis and smoking. please evaluate.
SOB

## 2024-05-01 NOTE — PROGRESS NOTE ADULT - ASSESSMENT
New/worsened SOB:   Probably cardiology because for no worsening, but possible contribution of pulmonary disease from prolonged smoking history    See cardiology recommendation:  -TTE   -CCTA to r/o obstructive CAD   -metoprolol 50 mg BID for PVC suppression for better accuracy of CCTA  -can try IV lasix 20 mg to see if any symptomatic improvement.   -rest of meds pending above   -Hba1c, lipds, TSH    See CCTA: Strongly positive for multivessel coronary artery disease.  It needs review by cardiology and probable cardiac catheterization.  And definitive treatment plan.  Meantime evaluate cardiac risk factors, smoking education, and control blood pressure lipids.  Also agree with mild diuresis with evidence of CHF as well-HFrEF.    Long-term tobacco abuse-reviewed at length with patient.  Explained is contributing factor to shortness of breath in multiple ways.  Tolerates risk factor for coronary artery disease and emphysema.  He states that he has no urge while he is here in the hospital and I did offer nicotine replacement to him but he deferred at this time.  Would agree with pulmonary that it could be helpful to get patient off the injection.  This is critical in view of probable diagnoses here including coronary artery disease and possibly COPD.    Also see evidence of fasting glucose intolerance and dietary should speak to the patient regarding diabetic dietary guidelines.

## 2024-05-02 NOTE — PROGRESS NOTE ADULT - ASSESSMENT
65 yo M w HFmrEF, HTN,HLD, tobacco use disorder presented with SOB, found to have triple vessel disease on CCTA. Underwent LHC today and admitted to CCU for sheath removal and monitoring.     Impression:  -CAD s/p PCI of mid LAD FFR positive lesion , RCA  with collaterals from left, LCX ostial 80% disease, plan for staged PCI in 4 weeks   -Acute on chronic systolic heart failure, LVEDP 30 mmHg, EF 30-35%   -HTN/HLD  -tobacco use disorder       Plan:   -remove femoral arterial sheath once ACT <150   -cont aspirin 81 mg, plavix 75 mg, atorvastatin 80 mg  -potential switch from losartan to entresto tomorrow if BP tolerates   -switch to metoprolol succinate 50 mg daily   -add farxiga 10 mg daily   -complete post procedure hydration  -CXR in AM, monitor UOP , IV lasix 40 mg if s/s of fluid overload   -smoking cessation counseling

## 2024-05-02 NOTE — PROGRESS NOTE ADULT - SUBJECTIVE AND OBJECTIVE BOX
CHIEF COMPLAINT:  Patient is a 64y old  Male who presents with a chief complaint of shortness of breath (02 May 2024 22:01)      INTERVAL HISTORY/OVERNIGHT EVENTS:    Pt underwent LHC after positive CCTA. PCI of midLAD with LIDIA x 1 done.   Accepted to CCU for overnight monitoring and sheath removal.   ======================  MEDICATIONS:  aspirin  chewable 81 milliGRAM(s) Oral daily  atorvastatin 80 milliGRAM(s) Oral at bedtime  isosorbide   mononitrate ER Tablet (IMDUR) 30 milliGRAM(s) Oral daily  losartan 25 milliGRAM(s) Oral daily  metoprolol tartrate 25 milliGRAM(s) Oral two times a day    DRIPS:  sodium chloride 0.9%. 1000 milliLiter(s) (100 mL/Hr) IV Continuous <Continuous>    PRN:       ======================  PHYSICAL EXAMINATION:  GEN:  nad.   HEENT:  eomi. ncat  PULM:  b/l lung sounds   CARD: s1, s2  ABD: +bs. ntnd  EXT:  no new rashes.    NEURO:  no new focal deficits.   Access site: R femoral access in place   ======================  OBJECTIVE:        VS:  T(F): 97.4 (05-02 @ 20:30), Max: 98.2 (05-02 @ 04:39)  HR: 56 (05-02 @ 22:00) (54 - 70)  BP: 107/69 (05-02 @ 22:00) (107/69 - 172/97)  RR: 19 (05-02 @ 22:00) (16 - 21)  SpO2: 99% (05-02 @ 22:00) (98% - 100%)  CVP(mm Hg): --  CO: --  CI: --  PA: --  PCWP: --    I/O:      05-01 @ 07:01  -  05-02 @ 07:00  --------------------------------------------------------  IN: 220 mL / OUT: 0 mL / NET: 220 mL    05-02 @ 07:01  -  05-02 @ 22:39  --------------------------------------------------------  IN: 260 mL / OUT: 500 mL / NET: -240 mL        Weight trend:  Weight (kg): 72.6 (05-02)    ======================    LABS:                          15.9   10.48 )-----------( 234      ( 02 May 2024 07:44 )             48.2     05-02    142  |  106  |  20  ----------------------------<  112<H>  4.1   |  25  |  1.1    Ca    9.2      02 May 2024 07:44  Mg     2.0     05-02    TPro  6.2  /  Alb  3.9  /  TBili  0.5  /  DBili  x   /  AST  17  /  ALT  12  /  AlkPhos  95  05-02    LIVER FUNCTIONS - ( 02 May 2024 07:44 )  Alb: 3.9 g/dL / Pro: 6.2 g/dL / ALK PHOS: 95 U/L / ALT: 12 U/L / AST: 17 U/L / GGT: x                       Troponin trend:      05-01 Chol 203<H> LDL -- HDL 35<L> Trig 67    Cultures:  Urinalysis Basic - ( 02 May 2024 07:44 )    Color: x / Appearance: x / SG: x / pH: x  Gluc: 112 mg/dL / Ketone: x  / Bili: x / Urobili: x   Blood: x / Protein: x / Nitrite: x   Leuk Esterase: x / RBC: x / WBC x   Sq Epi: x / Non Sq Epi: x / Bacteria: x          RADIOLOGY:  -CXR:  -TTE:  -STRESS TEST:  -CATHETERIZATION:    ECG:    TELEMETRY EVENTS:

## 2024-05-02 NOTE — PROGRESS NOTE ADULT - ASSESSMENT
C/w medical therapy for CAD  Add Imdur  C/w BB, ASA, statin, ARB    Cath today  Further recommendations post cath.

## 2024-05-02 NOTE — PROGRESS NOTE ADULT - SUBJECTIVE AND OBJECTIVE BOX
Patient is a 64y old  Male who presents with a chief complaint of shortness of breath (02 May 2024 11:16)    HPI:  Patient is a 64-year-old man with a history of HTN, HDL ( was on amlodipine and lipitor but stopped taking his meds ) has not seen primary care physician for routine physical exams in some time, current heavy  smoker ( 1 pack /day > 50 yrs ) presenting to the ED for shortness of breath for the past 4 days, without an obvious pattern, including nonexertional and exertional. Patient mentioned that it started 4 days ago, intermittent, comes at rest, during the day, on exertion and even at night. Yesterday morning he felt it got worse after he went to work. This is first time it happens. No associated chest pain,fever, chills, cough, sore throat, orthopnea.  Patient sometimes has accompanied episodes of his heart pounding,  Patient was seen at urgent care clinic, where his EKG showed frequent PVCs, and he was sent to the emergency department.  No syncope or near syncope, orthopnea, generalized weakness, nausea, vomiting.  No immobilization or recent surgery, personal or family h/o VTE, hemoptysis, malignancy, or hormone use. Endorses daily ibuprofen use for degenerative aches, but denies melena/hematochezia.     To note, pt saw Dr. Reynolds in 2022 for pre-op clearance  Moh's surgery.   TTE 12/16/2021 showed EF 45-50%. Acc to patient he was told he needs a stress test but never followed up cz he felt fine.     Vitals: T 97.7, HR 55, /74, spo2 96% on RA   Labs: trop 43, pro-BNP 4029  EKG: sinus with PVCs, prolonged QTc, ST and T wave changes ( present on prior EKG )   CXR: RLL opacity, no congestion or effusion    (30 Apr 2024 23:39)      SUBJ:  Patient seen and examined. No CP, still with dyspnea.      MEDICATIONS  (STANDING):  aspirin  chewable 81 milliGRAM(s) Oral daily  atorvastatin 80 milliGRAM(s) Oral at bedtime  enoxaparin Injectable 40 milliGRAM(s) SubCutaneous every 24 hours  losartan 25 milliGRAM(s) Oral daily  metoprolol tartrate 25 milliGRAM(s) Oral two times a day    MEDICATIONS  (PRN):            Vital Signs Last 24 Hrs  T(C): 35.9 (02 May 2024 12:44), Max: 36.8 (01 May 2024 19:45)  T(F): 96.6 (02 May 2024 12:44), Max: 98.2 (01 May 2024 19:45)  HR: 58 (02 May 2024 13:45) (58 - 77)  BP: 152/99 (02 May 2024 13:45) (138/91 - 203/101)  BP(mean): 117 (01 May 2024 22:54) (117 - 128)  RR: 16 (02 May 2024 12:44) (16 - 18)  SpO2: 99% (02 May 2024 07:55) (98% - 99%)    Parameters below as of 02 May 2024 07:55  Patient On (Oxygen Delivery Method): room air          PHYSICAL EXAM:    GEN: AAO x 3, NAD  HEENT: NC/AT, PERRL  Neck: No JVD, no bruits  CV: Reg, S1-S2, no murmur  Lungs: CTAB  Abd: Soft, non-tender  Ext: No edema        05-01-24 @ 07:01  -  05-02-24 @ 07:00  --------------------------------------------------------  IN: 220 mL / OUT: 0 mL / NET: 220 mL        I&O's Summary    01 May 2024 07:01  -  02 May 2024 07:00  --------------------------------------------------------  IN: 220 mL / OUT: 0 mL / NET: 220 mL    	    TELEMETRY:    ECG:    TTE:      LABS:                        15.9   10.48 )-----------( 234      ( 02 May 2024 07:44 )             48.2     05-02    142  |  106  |  20  ----------------------------<  112<H>  4.1   |  25  |  1.1    Ca    9.2      02 May 2024 07:44  Mg     2.0     05-02    TPro  6.2  /  Alb  3.9  /  TBili  0.5  /  DBili  x   /  AST  17  /  ALT  12  /  AlkPhos  95  05-02              BNP  RADIOLOGY & ADDITIONAL STUDIES:      IMPRESSION AND PLAN:

## 2024-05-02 NOTE — PROGRESS NOTE ADULT - SUBJECTIVE AND OBJECTIVE BOX
24H events:    Patient is a 64y old Male who presents with a chief complaint of shortness of breath (01 May 2024 21:58)    Primary diagnosis of Shortness of breath    Today is hospital day 2d. This morning patient was seen and examined at bedside, resting comfortably in bed.    No acute or major events overnight.    Code Status: Full code    PAST MEDICAL & SURGICAL HISTORY  Obesity    Hypertension  pt stopped taking meds on his own    Hyperlipidemia    MEÑO (obstructive sleep apnea)  not using machine, machine was retrieved by insurance company    Cigarette smoker within last 12 months  current smoker    No significant past surgical history      SOCIAL HISTORY:  Social History:      ALLERGIES:  No Known Allergies    MEDICATIONS:  STANDING MEDICATIONS  aspirin  chewable 81 milliGRAM(s) Oral daily  atorvastatin 80 milliGRAM(s) Oral at bedtime  enoxaparin Injectable 40 milliGRAM(s) SubCutaneous every 24 hours  losartan 25 milliGRAM(s) Oral daily  metoprolol tartrate 25 milliGRAM(s) Oral two times a day    PRN MEDICATIONS    VITALS:   T(F): 98.2  HR: 70  BP: 138/91  RR: 18  SpO2: 99%    PHYSICAL EXAM:   GENERAL: NAD, lying in bed comfortably  HEAD:  Atraumatic, Normocephalic  EYES: EOMI, sclera clear  ENT: Moist mucous membranes  NECK: Supple, trachea midline  CHEST/LUNG: Clear to auscultation anteriorly bilaterally  HEART: Regular rate and rhythm; No appreciable murmurs, rubs, or gallops  ABDOMEN: Soft, nontender, nondistended  EXTREMITIES:  2+ Radial Pulses, brisk capillary refill. No clubbing, cyanosis, or edema  NERVOUS SYSTEM:  A&Ox3, no noted facial droop. Moving all extremities w/o difficulty.   SKIN: No rashes or lesions to b/l forearm, face.     (  ) Indwelling Lehman Catheter:   Date insterted:    Reason (  ) Critical illness     (  ) urinary retention    (  ) Accurate Ins/Outs Monitoring     (  ) CMO patient    (  ) Central Line:   Date inserted:  Location: (  ) Right IJ     (  ) Left IJ     (  ) Right Fem     (  ) Left Fem    (  ) SPC        (  ) pigtail       (  ) PEG tube       (  ) colostomy       (  ) jejunostomy  (  ) U-Dall    LABS:                        15.9   10.48 )-----------( 234      ( 02 May 2024 07:44 )             48.2     05-02    142  |  106  |  20  ----------------------------<  112<H>  4.1   |  25  |  1.1    Ca    9.2      02 May 2024 07:44  Mg     2.0     05-02    TPro  6.2  /  Alb  3.9  /  TBili  0.5  /  DBili  x   /  AST  17  /  ALT  12  /  AlkPhos  95  05-02      Urinalysis Basic - ( 02 May 2024 07:44 )    Color: x / Appearance: x / SG: x / pH: x  Gluc: 112 mg/dL / Ketone: x  / Bili: x / Urobili: x   Blood: x / Protein: x / Nitrite: x   Leuk Esterase: x / RBC: x / WBC x   Sq Epi: x / Non Sq Epi: x / Bacteria: x

## 2024-05-02 NOTE — PROGRESS NOTE ADULT - SUBJECTIVE AND OBJECTIVE BOX
RIK HELMS  64y  Male  HPI:  Patient is a 64-year-old man with a history of HTN, HDL ( was on amlodipine and lipitor but stopped taking his meds ) has not seen primary care physician for routine physical exams in some time, current heavy  smoker ( 1 pack /day > 50 yrs ) presenting to the ED for shortness of breath for the past 4 days, without an obvious pattern, including nonexertional and exertional. Patient mentioned that it started 4 days ago, intermittent, comes at rest, during the day, on exertion and even at night. Yesterday morning he felt it got worse after he went to work. This is first time it happens. No associated chest pain,fever, chills, cough, sore throat, orthopnea.  Patient sometimes has accompanied episodes of his heart pounding,  Patient was seen at urgent care clinic, where his EKG showed frequent PVCs, and he was sent to the emergency department.  No syncope or near syncope, orthopnea, generalized weakness, nausea, vomiting.  No immobilization or recent surgery, personal or family h/o VTE, hemoptysis, malignancy, or hormone use. Endorses daily ibuprofen use for degenerative aches, but denies melena/hematochezia.     To note, pt saw Dr. Reynolds in 2022 for pre-op clearance  Moh's surgery.   TTE 12/16/2021 showed EF 45-50%. Acc to patient he was told he needs a stress test but never followed up cz he felt fine.     Vitals: T 97.7, HR 55, /74, spo2 96% on RA   Labs: trop 43, pro-BNP 4029  EKG: sinus with PVCs, prolonged QTc, ST and T wave changes ( present on prior EKG )   CXR: RLL opacity, no congestion or effusion    (30 Apr 2024 23:39)    MEDICATIONS  (STANDING):  aspirin  chewable 81 milliGRAM(s) Oral daily  atorvastatin 80 milliGRAM(s) Oral at bedtime  enoxaparin Injectable 40 milliGRAM(s) SubCutaneous every 24 hours  isosorbide   mononitrate ER Tablet (IMDUR) 30 milliGRAM(s) Oral daily  losartan 25 milliGRAM(s) Oral daily  metoprolol tartrate 25 milliGRAM(s) Oral two times a day  sodium chloride 0.9%. 1000 milliLiter(s) (100 mL/Hr) IV Continuous <Continuous>    MEDICATIONS  (PRN):    INTERVAL EVENTS: Patient seen earlier today prior to cardiac cath. Patient nervous about procedure and findings.     T(C): 36.3 (05-02-24 @ 20:30), Max: 36.8 (05-02-24 @ 04:39)  HR: 66 (05-02-24 @ 20:45) (58 - 70)  BP: 154/105 (05-02-24 @ 20:45) (138/91 - 172/97)  RR: 18 (05-02-24 @ 20:45) (16 - 21)  SpO2: 99% (05-02-24 @ 20:45) (98% - 100%)  Wt(kg): --Vital Signs Last 24 Hrs  T(C): 36.3 (02 May 2024 20:30), Max: 36.8 (02 May 2024 04:39)  T(F): 97.4 (02 May 2024 20:30), Max: 98.2 (02 May 2024 04:39)  HR: 66 (02 May 2024 20:45) (58 - 70)  BP: 154/105 (02 May 2024 20:45) (138/91 - 172/97)  BP(mean): 123 (02 May 2024 20:30) (117 - 123)  RR: 18 (02 May 2024 20:45) (16 - 21)  SpO2: 99% (02 May 2024 20:45) (98% - 100%)    Parameters below as of 02 May 2024 20:45  Patient On (Oxygen Delivery Method): room air        PHYSICAL EXAM:  GENERAL: NAD  NECK: Supple, No JVD  CHEST/LUNG: Clear  HEART: S1, S2, Regular   ABDOMEN: Soft, Nontender, Nondistended; Bowel sounds present  EXTREMITIES: No  edema  SKIN: No rashes or lesions    LABS:                          15.9   10.48 )-----------( 234      ( 02 May 2024 07:44 )             48.2             05-02    142  |  106  |  20  ----------------------------<  112<H>  4.1   |  25  |  1.1    Ca    9.2      02 May 2024 07:44  Mg     2.0     05-02    TPro  6.2  /  Alb  3.9  /  TBili  0.5  /  DBili  x   /  AST  17  /  ALT  12  /  AlkPhos  95  05-02    LIVER FUNCTIONS - ( 02 May 2024 07:44 )  Alb: 3.9 g/dL / Pro: 6.2 g/dL / ALK PHOS: 95 U/L / ALT: 12 U/L / AST: 17 U/L / GGT: x                         Urinalysis Basic - ( 02 May 2024 07:44 )    Color: x / Appearance: x / SG: x / pH: x  Gluc: 112 mg/dL / Ketone: x  / Bili: x / Urobili: x   Blood: x / Protein: x / Nitrite: x   Leuk Esterase: x / RBC: x / WBC x   Sq Epi: x / Non Sq Epi: x / Bacteria: x              RADIOLOGY & ADDITIONAL TESTS:    < from: CT Angio Heart and Coronaries w/ IV Cont (05.01.24 @ 13:05) >  PROCEDURE DATE:  05/01/2024          INTERPRETATION:  CLINICAL INDICATION: Chest pain.    COMPARISON: None.    TECHNIQUE: Unenhanced axial ECG-triggered CT was obtained through the   chest. CT angiography of the heart was then performed utilizing ECG-gated   imaging. 3-D reconstruction images were obtained. In preparation for the   examination, the patient received 0.8 mg of sublingual nitroglycerine for   coronary vasodilatation.    CONTRAST:  80 cc of Omnipaque 350    INTERPRETATION:    Calcium Score:    Vessel              Calcium Score    =======================================================  LM:                    293  LAD:                 325  LCX:             241  RCA:                 10  =======================================================  Total:                869      CORONARY CT ANGIOGRAM:    There is a right dominant coronary arterial system.    Left Main Artery: Mixed plaque in the ostial left main with mild stenosis.    Left Anterior Descending Artery: Long area of mixed plaque in the   proximal LAD with moderate stenosis. Mixed plaque in the mid LAD   concerning for severe stenosis. Mixed plaque in the distal LAD concerning   for severe stenosis. Mixed plaque in the second diagonal concerning for   severe stenosis.    Left Circumflex Artery: Total occlusion of the proximal to mid circumflex   after the branch point of a high OM1. Mixed plaque with at least moderate   stenosis in OM1 (evaluation limited by blooming artifact).    Right Coronary Artery: The RCA is not well-visualized and appears to be   totally occluded just after the ostium.    CARDIAC MORPHOLOGY: The left ventricular cavity is dilated, recommend   correlation with echocardiography.  There is no pericardial effusion.   There are aortic valve calcifications.    IMAGED AORTA: The thoracic aorta is normal in caliber.    IMAGED EXTRACARDIAC FINDINGS:  Extracardiac findings section dictated by radiology at the end of the   final report.    IMPRESSION:    Severe multivessel coronary artery disease with heavy coronary artery   calcifications.  Severe LAD disease as described above.  Total occlusion of the proximal to mid circumflex after the branch point   of a high OM1.  The RCA is not well-visualized and appears to be totally occluded just   after the ostium.    The total Agatston coronary artery calcium score equals 869, which   corresponds to 93rd percentile for age, gender and ethnicity.    CAD-RADS 5.      CAD-RADS  Reporting and data system  ______________________________________________________________________    CAD-RADS 0  -    No plaque or stenosis                 Documented absence   of CAD  CAD-RADS 1  -    1-24% Minimal stenosis          Minimal   non-obstructive CAD  CAD-RADS 2  -    25 - 49% Mild stenosis                Mild   non-obstructive CAD  CAD-RADS 3  -    50 - 69% stenosis                        Moderate   Stenosis  CAD-RADS 4  -    A. 70 - 99% Stenosis  or      Severe Stenosis                                  B. Left main >50%  CAD-RADS 5  -    100% (total occlusion)                 Total coronary   occlusion  CAD-RADS N  -    Non-diagnostic study                   Obstructive CAD   cannot be excluded    Hari Davidson M.D., Attending Cardiologist  Jessica Morrison M.D., Attending Radiologist      Extracardiac Findings Only (Cardiac findings were not reviewed by the   radiologist)    Findings: Scattered bilateral interlobular septal thickening and trace  bilateral pleural effusions, possibly reflecting underlying CHF. Trace   pericardial effusion. Multiple subcentimeter and mildly enlarged   mediastinal lymph nodes are nonspecific and possibly reactive.    Cardiac findings independently dictated bythe Cardiology Attending as   above.    --- End of Report ---    < end of copied text >

## 2024-05-02 NOTE — PROGRESS NOTE ADULT - CRITICAL CARE ATTENDING COMMENT
This included management of respiratory function, adjustment of vasopressor support, optimization of nutrition and glycemic control, management of antibiotics, review and adjustment of fluid balances, management of pain and sedation, review of radiographs and imaging studies, and communication with consultants.    (Date of Service: 05/02/2024)

## 2024-05-02 NOTE — PROGRESS NOTE ADULT - ASSESSMENT
Patient is a 64-year-old man with a history of HTN, HDL ( was on amlodipine and lipitor but stopped taking his meds ) has not seen primary care physician for routine physical exams in some time, current heavy  smoker ( 1 pack /day > 50 yrs ) presenting to the ED for shortness of breath for the past 4 days, without an obvious pattern, including nonexertional and exertional.    Shortness of breath   R/O ACS   CHF, HFrEF on echo  - + CTA, scheduled for cath  - on tele   - continue Aspirin 81 mg OD   - started on Atorvastatin 80 mg OD   - start Metoprolol and Losartan    RLL opacity   - pt known to ahve right lung central bronchiectasis on CT non con done in Jan 2024   - will monitor off abx for now as no clinical sxs of PNA   - SMOKING CESSATION    DVT ppx:: lovenox   GI ppx: NA   Diet: NPO for cath  Activity: as tolerated     Maintain on cardiac monitor, pan as per cardiology

## 2024-05-02 NOTE — PROGRESS NOTE ADULT - ASSESSMENT
Patient is a 64-year-old man with a history of HTN, HDL ( was on amlodipine and lipitor but stopped taking his meds ) has not seen primary care physician for routine physical exams in some time, current heavy  smoker ( 1 pack /day > 50 yrs ) presenting to the ED for shortness of breath for the past 4 days, without an obvious pattern, including nonexertional and exertional.    # Shortness of breath   #R/O ACS   #R/O CHF   - Vitals: T 97.7, HR 55, /74, spo2 96% on RA   - Labs: trop 43, pro-BNP 4029  - EKG: sinus with PVCs, prolonged QTc, ST and T wave changes ( present on prior EKG )   - CXR: RLL opacity, no congestion or effusion   - TTE 12/16/2021 showed    1.Left ventricular ejection fraction is estimated at 45-50%.  2.Mildly decreased global left ventricular systolic function.  3.Spectral Doppler shows impaired relaxation pattern of left ventricular myocardial filling   (Grade I diastolic dysfunction).  4.Mildly calcified aortic valve without stenosis.  5.Trivial aortic regurgitation.  - pt saw Dr. Reynolds in 2022 for pre-op clearance  Moh's surgery., recommended stress test as per pt but lost to f/u   - no leukocytosis, fever, cough, or other sxs of PNA     Plan:   - monitor on tele   - s/p aspirin load in ED   - c/w aspirin 81 mg OD   - start atorvastatin 80 mg OD   - BP is controlled, start ACE if tolerated   - start metoprolol if HR tolerates ( currently HR 55-70 range )   - check A1C 5.7, lipid profile , TSH 1.56  - CCTA Severe multivessel coronary artery disease with heavy coronary artery calcifications, Severe LAD disease, Total occlusion of the proximal to mid circumflex after the branch point of a high OM1. The RCA is not well-visualized and appears to be totally occluded just after the ostium. The total Agatston coronary artery calcium score equals 869, which corresponds to 93rd percentile for age, gender and ethnicity.  - C today    #RLL opacity   - pt known to ahve right lung central bronchiectasis   on CT non con done in Jan 2024   - will monitor off abx for now as no clinical sxs of PNA     #DVT ppx:: lovenox   #GI ppx: NA   #Diet: NPO for cath  #Activity: as tolerated   #Dispo: tele

## 2024-05-02 NOTE — CHART NOTE - NSCHARTNOTEFT_GEN_A_CORE
PROCEDURE:   [X] [facility]   [X] Intervention     PHYSICIAN:  Dr. Grossman  FELLOW: Dr. Mccartney    Pre-procedure Diagnosis: Positive CCTA    Consent:    [X] Patient      Anesthesia:   [X] Sedation   [X] Local     Access & Closure:   [X] 6 Fr R Femoral Artery -> Manual compression    IV Contrast: 150 mL      Intervention: Successful PCI of mid-LAD with balloon angioplasty s/p LIDIA x 1    Implants: SYNERGY XD 3.5X24 mm    AUC: 7     FINDINGS:     Coronary Dominance: Left    LM: Minor luminal irregularities.     LAD: mid-LAD 80% lesion hemodynamically significant by FFR (0.76) s/p PCI. Distal LAD 70% disease not amenable to intervention   D1: Small vessel. 90% ostial disease not amenable to intervention     CX: Ostial 80% disease  OM1: Mild disease     RCA: . Supplied by left to right collaterals     LVEDP: 30 mmHg     EF: 36 %     AV gradient: No significant gradient     ESTIMATED BLOOD LOSS: < 10 mL      CONDITION:   [X] Good     SPECIMEN REMOVED: N/A     POST-OP DIAGNOSIS:    [X] 3 Vessel Coronary Artery Disease      PLAN OF CARE:   [X] Return to In-patient bed   [X] Medications:   - Continue ASA 81 mg PO QD  - Continue Clopidogrel 75 mg PO QD  [X] IV Fluids: NS @ 75cc/h x 2 hours  [x] Remove femoral sheath. Hold manual pressure if signs of hematoma or bleeding over femoral access site.  [x] Staged procedure for LCx in 4 weeks. PROCEDURE:   [X] [facility]   [X] Intervention     PHYSICIAN:  Dr. Grossman  FELLOW: Dr. Mccartney    Pre-procedure Diagnosis: Positive CCTA    Consent:    [X] Patient      Anesthesia:   [X] Sedation   [X] Local     Access & Closure:   [X] 6 Fr R Femoral Artery -> Manual compression    IV Contrast: 150 mL      Intervention: Successful PCI of mid-LAD with balloon angioplasty s/p LIDIA x 1    Implants: SYNERGY XD 3.5X24 mm    AUC: 7     FINDINGS:     Coronary Dominance: Left    LM: 40% disease     LAD: Proximal LAD 60% disease. mid-LAD 80% lesion hemodynamically significant by FFR (0.76) s/p PCI. Distal LAD 70% disease not amenable to intervention. Not a good target for CABG.  D1: Small vessel. 90% ostial disease not amenable to intervention. Distal  supplied by collaterals.     CX: Ostial 80% disease. Distal  supplied by collaterals.   OM1: Ostial 80% disease   OM2: Small vessel. Moderate diffuse disease.     RCA: . Supplied by left to right collaterals     LVEDP: 30 mmHg     EF: 36 %     AV gradient: No significant gradient     ESTIMATED BLOOD LOSS: < 10 mL      CONDITION:   [X] Good     SPECIMEN REMOVED: N/A     POST-OP DIAGNOSIS:    [X] 3 Vessel Coronary Artery Disease; s/p PCI of       PLAN OF CARE:   [X] Return to In-patient bed   [X] Medications:   - Continue ASA 81 mg PO QD  - Continue Clopidogrel 75 mg PO QD  [X] IV Fluids: NS @ 75cc/h x 2 hours  [x] Remove femoral sheath. Hold manual pressure if signs of hematoma or bleeding over femoral access site.  [x] Staged procedure for LCx in 4 weeks. PROCEDURE:     [X] [facility]   [X] Intervention     PHYSICIAN:  Dr. Grossman  FELLOW: Dr. Mccartney    Pre-procedure Diagnosis: Positive CCTA    Consent:    [X] Patient      Anesthesia:   [X] Sedation   [X] Local     Access & Closure:   [X] 6 Fr R Femoral Artery -> Manual compression    IV Contrast: 150 mL      Intervention: Successful PCI of mid-LAD with balloon angioplasty s/p LIDIA x 1    Implants: SYNERGY XD 3.5X24 mm    AUC: 7     FINDINGS:     Coronary Dominance: Left    LM: 40% disease     LAD: Proximal LAD 40% disease. mid-LAD 80% lesion hemodynamically significant by FFR (0.76) s/p PCI. Distal LAD diffuse disease not amenable to intervention. Poor target for LIMA distally.  D1: Small vessel. 90% ostial disease not amenable to intervention. Mid vessel -  supplied by collaterals.     CX: Ostial 80% disease. Distal  supplied by collaterals.   OM1: Prox 90% disease   OM2: Small vessel. Moderate diffuse disease.     RCA: . Supplied by left to right collaterals     LVEDP: 30 mmHg     EF: 36 % by echo    AV gradient: No significant gradient     ESTIMATED BLOOD LOSS: < 10 mL      CONDITION:   [X] Good     SPECIMEN REMOVED: N/A     POST-OP DIAGNOSIS:    [X] 3 Vessel Coronary Artery Disease; s/p PCI of       PLAN OF CARE:   [X] Return to In-patient bed   [X] Medications:   - Continue ASA 81 mg PO QD  - Continue Clopidogrel 75 mg PO QD  [X] IV Fluids: NS @ 75cc/h x 2 hours  [x] Remove femoral sheath. Hold manual pressure if signs of hematoma or bleeding over femoral access site.  [x] Staged procedure for LCX in 4-6 weeks.

## 2024-05-02 NOTE — CHART NOTE - NSCHARTNOTEFT_GEN_A_CORE
63 yo M w HFmrEF, HTN,HLD, tobacco use disorder presents with SOB for 4 days.   HST 43, pBNP 4K  EKG - NSR, frequent PVCs, TWI in lateral leads old   CXR : RLL opacity     POCUS: IVC normal size and collapsing >50%, EF 30-35%.    TTE 4/6/2023  1.The left ventricular systolic function is low-normal with an ejection fraction of 53 %.  3.Entire lateral wall is hypokinetic.  4.Normal right ventricular cavity size and normal systolic function.  5.Mild-to-moderate aortic regurgitation.  6.Pulmonary artery systolic pressure could not be estimated.  7.The proximal ascending aorta is mildly dilated at 3.7cm (inedxed 2.07 cm/m^2).  8.Compared to the transthoracic echocardiogram performed on 12/16/2021 the LVEF has increased    Impression :   -SOB- not much evidence of fluid overload other than elevated BNP and mildly elevated troponin   -h/o HFmrEF not compliant with medications   -HTN/hld/tobacco use disorder     Recommendations:   -TTE   -CCTA to r/o obstructive CAD   -metoprolol 50 mg BID for PVC suppression for better accuracy of CCTA  -can try IV lasix 20 mg to see if any symptomatic improvement.   -rest of meds pending above   -Hba1c, lipds, TSH . PREOPERATIVE DAY OF PROCEDURE EVALUATION:  I have personally seen and examined the patient.  I agree with the history and physical which I have reviewed and noted any changes below.     63 yo M, current smoker, w HFmrEF, HTN, HLD, not on any meds at home, who presents with SOB for 4 days, pBNP 4K, EKG - NSR, frequent PVCs, TWI in lateral leads old.  Pt is now referred for [facility] with possible intervention if clinically indicated, to r/o ischemic etiology.     TTE 4/6/2023  1.The left ventricular systolic function is low-normal with an ejection fraction of 53 %.  3.Entire lateral wall is hypokinetic.  4.Normal right ventricular cavity size and normal systolic function.  5.Mild-to-moderate aortic regurgitation.  6.Pulmonary artery systolic pressure could not be estimated.  7.The proximal ascending aorta is mildly dilated at 3.7cm (inedxed 2.07 cm/m^2).  8.Compared to the transthoracic echocardiogram performed on 12/16/2021 the LVEF has increased    Bleeding Risk Score:   0.8%  IVF pre-hydration: 250cc NS bolus   -received ASA 324mg po x 1, then 81mg po x 1  -on BB, statin, Imdur, ARB       (Signed electronically by __________)  05-02-24 @ 18:00

## 2024-05-03 NOTE — PROGRESS NOTE ADULT - SUBJECTIVE AND OBJECTIVE BOX
Cardiology Follow up s/p PCI    RODRIGUEZRIK SOUSA   64y Male  PAST MEDICAL & SURGICAL HISTORY:    Obesity      Hypertension  pt stopped taking meds on his own      Hyperlipidemia      MEÑO (obstructive sleep apnea)  not using machine, machine was retrieved by insurance company      Cigarette smoker within last 12 months  current smoker      No significant past surgical history           HPI:  Patient is a 64-year-old man with a history of HTN, HDL ( was on amlodipine and lipitor but stopped taking his meds ) has not seen primary care physician for routine physical exams in some time, current heavy  smoker ( 1 pack /day > 50 yrs ) presenting to the ED for shortness of breath for the past 4 days, without an obvious pattern, including nonexertional and exertional. Patient mentioned that it started 4 days ago, intermittent, comes at rest, during the day, on exertion and even at night. Yesterday morning he felt it got worse after he went to work. This is first time it happens. No associated chest pain,fever, chills, cough, sore throat, orthopnea.  Patient sometimes has accompanied episodes of his heart pounding,  Patient was seen at urgent care clinic, where his EKG showed frequent PVCs, and he was sent to the emergency department.  No syncope or near syncope, orthopnea, generalized weakness, nausea, vomiting.  No immobilization or recent surgery, personal or family h/o VTE, hemoptysis, malignancy, or hormone use. Endorses daily ibuprofen use for degenerative aches, but denies melena/hematochezia.     To note, pt saw Dr. Solomon in 2022 for pre-op clearance  Moh's surgery.   TTE 12/16/2021 showed EF 45-50%. Acc to patient he was told he needs a stress test but never followed up cz he felt fine.     Vitals: T 97.7, HR 55, /74, spo2 96% on RA   Labs: trop 43, pro-BNP 4029  EKG: sinus with PVCs, prolonged QTc, ST and T wave changes ( present on prior EKG )   CXR: RLL opacity, no congestion or effusion    (30 Apr 2024 23:39)    Allergies    No Known Allergies    Intolerances    Patient seen and examined at bedside. No acute events overnight.  Patient without complaints. Pt ambulated without issues/symptoms  Denies CP, SOB, palpitations, or dizziness  No events on telemetry overnight    Vital Signs Last 24 Hrs  T(C): 36.2 (03 May 2024 08:00), Max: 36.7 (02 May 2024 17:19)  T(F): 97.1 (03 May 2024 08:00), Max: 98 (02 May 2024 17:19)  HR: 61 (03 May 2024 11:00) (51 - 73)  BP: 144/82 (03 May 2024 11:00) (105/65 - 172/97)  BP(mean): 107 (03 May 2024 11:00) (82 - 123)  RR: 20 (03 May 2024 11:00) (14 - 21)  SpO2: 97% (03 May 2024 11:00) (96% - 100%)    Parameters below as of 03 May 2024 11:00  Patient On (Oxygen Delivery Method): room air    MEDICATIONS  (STANDING):  aspirin  chewable 81 milliGRAM(s) Oral daily  atorvastatin 80 milliGRAM(s) Oral at bedtime  chlorhexidine 2% Cloths 1 Application(s) Topical daily  clopidogrel Tablet 75 milliGRAM(s) Oral daily  isosorbide   mononitrate ER Tablet (IMDUR) 30 milliGRAM(s) Oral daily  metoprolol tartrate 25 milliGRAM(s) Oral two times a day  sodium chloride 0.9%. 1000 milliLiter(s) (100 mL/Hr) IV Continuous <Continuous>    MEDICATIONS  (PRN):      REVIEW OF SYSTEMS:          All negative except as mentioned in HPI    PHYSICAL EXAM:           CONSTITUTIONAL: Well-developed; well-nourished; in no acute distress  	SKIN: warm, dry  	HEAD: Normocephalic; atraumatic  	EYES: PERRL.  	ENT: No nasal discharge, airway clear, mucous membranes moist  	NECK: Supple; non tender.  	CARD: +S1, +S2, no murmurs, gallops, or rubs. Regular rate and rhythm    	RESP: No wheezes, rales or rhonchi. CTA B/L  	ABD: soft ntnd, + BS x 4 quadrants  	EXT: moves all extremities,  no clubbing, cyanosis or edema  	NEURO: Alert and oriented x3, no focal deficits          PSYCH: Cooperative, appropriate          VASCULAR:  + Rad / + PTs / +  DPs          EXTREMITY:              Right Groin: dressing removed, access site soft, no hematoma, no pain, + pulses, no sign of infection, no numbness  	           ECG:   < from: 12 Lead ECG (05.02.24 @ 21:25) >    Ventricular Rate 57 BPM    Atrial Rate 57 BPM    P-R Interval 170 ms    QRS Duration 112 ms    Q-T Interval 490 ms    QTC Calculation(Bazett) 476 ms    P Axis 54 degrees    R Axis 7 degrees    T Axis 121 degrees    Diagnosis Line Sinus bradycardia  Incomplete left bundle branch block  Left ventricular hypertrophy with repolarization abnormality  Abnormal ECG    Confirmed by nolvia solomon ([id number]) on 5/2/2024 10:29:02 PM                                                                                                                2D ECHO:  < from: TTE Echo Complete w/ Contrast w/ Doppler (05.01.24 @ 08:55) >  Summary:   1. Moderately decreased global left ventricular systolic function.   2. Multiple left ventricular regional wall motion abnormalities exist.   See wall motion findings.   3. LV Ejection Fraction by Krause's Method with a biplane EF of 36 %.   4. Mildly increased LV wall thickness.   5. Spectral Doppler shows pseudonormal pattern of left ventricular   myocardial filling (Grade II diastolic dysfunction).   6. Mildly enlarged left atrium.   7. Normal right atrial size.   8. Mild to moderate mitral valve regurgitation.   9. Moderate thickening and calcification of the anterior mitral valve   leaflet.  10. Mild tricuspid regurgitation.  11. Mild aortic regurgitation.  12. Sclerotic aortic valve with decreased opening.    LABS:                        15.9   11.11 )-----------( 235      ( 03 May 2024 04:44 )             47.9     05-03    140  |  105  |  17  ----------------------------<  106<H>  3.8   |  21  |  1.0    Ca    9.0      03 May 2024 04:44  Mg     2.0     05-03    TPro  6.3  /  Alb  4.2  /  TBili  0.8  /  DBili  x   /  AST  18  /  ALT  13  /  AlkPhos  100  05-03    Magnesium: 2.0 mg/dL [1.8 - 2.4] (05-03-24 @ 04:44)  LIVER FUNCTIONS - ( 03 May 2024 04:44 )  Alb: 4.2 g/dL / Pro: 6.3 g/dL / ALK PHOS: 100 U/L / ALT: 13 U/L / AST: 18 U/L / GGT: x           PT/INR - ( 03 May 2024 04:44 )   PT: 14.20 sec;   INR: 1.24 ratio         PTT - ( 03 May 2024 04:44 )  PTT:62.1 sec  I&O's Summary    02 May 2024 07:01  -  03 May 2024 07:00  --------------------------------------------------------  IN: 720 mL / OUT: 1520 mL / NET: -800 mL    03 May 2024 07:01  -  03 May 2024 12:19  --------------------------------------------------------  IN: 440 mL / OUT: 500 mL / NET: -60 mL      BNP Magnesium: 2.0 mg/dL [1.8 - 2.4] (05-03-24 @ 04:44)      A/P:  I discussed the case with Cardiologist Dr. Grossman and recommend the following:  S/P PCI:  Access & Closure:   [X] 6 Fr R Femoral Artery -> Manual compression    IV Contrast: 150 mL      Intervention: Successful PCI of mid-LAD with balloon angioplasty s/p LIDIA x 1    Implants: SYNERGY XD 3.5X24 mm    AUC: 7     FINDINGS:     Coronary Dominance: Left    LM: 40% disease     LAD: Proximal LAD 40% disease. mid-LAD 80% lesion hemodynamically significant by FFR (0.76) s/p PCI. Distal LAD diffuse disease not amenable to intervention. Poor target for LIMA distally.  D1: Small vessel. 90% ostial disease not amenable to intervention. Mid vessel -  supplied by collaterals.     CX: Ostial 80% disease. Distal  supplied by collaterals.   OM1: Prox 90% disease   OM2: Small vessel. Moderate diffuse disease.     RCA: . Supplied by left to right collaterals     LVEDP: 30 mmHg     EF: 36 % by echo                                         Care as per CCU team                   Considering changing ARB to Entresto                    Ambulate patient around the unit                    Keep K = 4, Mg = 2  	     Continue DAPT ( Aspirin 81 mg PO Daily and Plavix 75 mg Daily ), B-Blocker, Statin Therapy, Isosorbide, Farxiga, ARB                   Patient given 30 day supply of ( Aspirin 81 mg daily and Plavix 75 mg daily ) to take at home                   Patient agreeing to take DAPT for at least one year or as directed by cardiologist                    Pt given instructions on importance of taking antiplatelet medication or risk acute stent thrombosis/death                   Post cath instructions, access site care and activity restrictions reviewed with patient                     Cardiac rehab information provided/referral and communication to Cardiac Rehab completed                      Benefits of Cardiac Rehab discussed with patient                   Patient instructed to call Cardiac Rehab and make first appointment after first f/u visit with Cardiologist                    Discussed with patient to return to hospital if experience chest pain, shortness breath, dizziness and site bleeding                   Aggressive risk factor modification, diet counseling, smoking cessation discussed with patient                       Can discharge patient from interventional cardiac standpoint after ambulating without symptoms and access site wnl, ECG and blood work reviewed                    Staged PCI of LCX in 4-6 weeks                   Follow up with Cardiology Dr. Grossman in two weeks. Patient instructed to call and make an appointment                     Discharge instructions as follows, when ready to d/c:    Activity:  - Do not drive or operate heavy machinery for 24 hours.  - Limit your physical or any strenuous activity for 2 weeks after angioplasty and 48 hours for angiogram. Support the groin site with your hand when you sneeze or cough. No heavy lifting ( objects more then 10 pounds).  - For wrist access, avoid using affected arm for 24 hours after removal of dressing and avoid heavy lifting for 7 days.  Hygiene:  - After 24 hours, you may shower and remove the dressing from the site. Do not tub bathe for one week. Do not rub or apply lotion, cream, powder to the affected site. Leave it open to air.   Diet:   - You may resume your diet. Low Sodium. Low Fat, Low Cholesterol.  If Diabetic - Carbohydrate Consistent Diet.      - Drink extra fluid unless otherwise advised.   Special Instructions:  - Bruising or black and blue at the puncture site is possible.  - If there is bleeding from the puncture site (groin or wrist) apply direct firm pressure on the site and call 911.  - Any sudden swelling, redness, fever, discharge or severe pain, call your physician or call the cath lab.   - If you notice any scab formation in the area avoid touching the site and allow it to heal.  - Numbness or "pins and needle" sensation in the affected arm, hand, leg or if the affected site become cool to touch or pale that persist for extended period of time call your physician immediately to be checked.  - If you developed chest pain, not relieved by your usual routine medication, fainting, lethargy, weakness, report to the nearest emergency room.   - Inform your Dentist or Surgeon if you are taking Aspirin or any antiplatelet medications. Report any bleeding in your urine or stool.   Medications:  - Soreness or tenderness at the site is possible it will diminish over time. You may take Tylenol every 4-6 hours as needed. Nothing stronger is needed.  - If you are diabetic and taking medication containing Metformin, do not take them for 48 hours after the procedure.     Any questions call Cardiac Cath Lab at [phone] or [phone], Monday - Friday from 7 - 9 pm.

## 2024-05-03 NOTE — CHART NOTE - NSCHARTNOTEFT_GEN_A_CORE
Date: May 3, 2024    [place] [address] [place] [address]  [phone]      To Whom It May Concern,     This letter certifies that Mr. Juliano De Luna presented and was admitted to [facility] on Tuesday, 04/30/24, and was treated and discharged on Friday, 05/03/24 from this facility. He is cleared to return to work on Monday, 05/13/24 without any restriction in activity.     If any questions or concerns please call [phone]    Thank you for your understanding and cooperation.          Patricio Hubbard MD  [phone]

## 2024-05-03 NOTE — PROGRESS NOTE ADULT - PROVIDER SPECIALTY LIST ADULT
CCU
Internal Medicine
Intervent Cardiology
Intervent Cardiology
Internal Medicine
CCU
Internal Medicine
Internal Medicine
Intervent Cardiology

## 2024-05-03 NOTE — DISCHARGE NOTE PROVIDER - CARE PROVIDERS DIRECT ADDRESSES
[email or web address]

## 2024-05-03 NOTE — PROGRESS NOTE ADULT - ASSESSMENT
New/worsened SOB:   Probably cardiology because for no worsening, but possible contribution of pulmonary disease from prolonged smoking history    See cardiology recommendation:  -TTE   -CCTA to r/o obstructive CAD   -metoprolol 50 mg BID for PVC suppression for better accuracy of CCTA  -can try IV lasix 20 mg to see if any symptomatic improvement.   -rest of meds pending above   -Hba1c, lipds, TSH    See CCTA: Strongly positive for multivessel coronary artery disease.  It needs review by cardiology and probable cardiac catheterization.  And definitive treatment plan.  Meantime evaluate cardiac risk factors, smoking education, and control blood pressure lipids.  Also agree with mild diuresis with evidence of CHF as well-HFrEF.    Long-term tobacco abuse-reviewed at length with patient.  Explained is contributing factor to shortness of breath in multiple ways.  Tolerates risk factor for coronary artery disease and emphysema.  He states that he has no urge while he is here in the hospital and I did offer nicotine replacement to him but he deferred at this time.  Would agree with pulmonary that it could be helpful to get patient off the injection.  This is critical in view of probable diagnoses here including coronary artery disease and possibly COPD.    Also see evidence of fasting glucose intolerance and dietary should speak to the patient regarding diabetic dietary guidelines.             New/worsened SOB:   Probably cardiology because for no worsening, but possible contribution of pulmonary disease from prolonged smoking history    See CCTA: Strongly positive for multivessel coronary artery disease.  It needs review by cardiology and probable cardiac catheterization.  And definitive treatment plan.  Meantime evaluate cardiac risk factors, smoking education, and control blood pressure lipids. Reiterated necessity of stopping smoking with the patient this morning.  Also broached the subject of vegetarian diet which would be a significant improvement for controlling his coronary artery disease.  Also agree with mild diuresis with evidence of CHF as well-HFrEF.    s/p cath:  cardio:  S/p PCI of LAD.    C/w DAPT  C/w BB, statin and dapagliflozin started  Consider switching to Entresto- cardio w review on f/u.     Staged PCI of LCX in 4-6 weeks    D/c home.- Recommended by intensive cardiologist and procedure cardiology.Will have patient get PFTs as an outpatient postdischarge.  Will have him follow-up with his primary at our office also and next 5 to 10 days.      Long-term tobacco abuse-reviewed at length with patient.  Explained is contributing factor to shortness of breath in multiple ways.  Tolerates risk factor for coronary artery disease and emphysema.  He states that he has no urge while he is here in the hospital and I did offer nicotine replacement to him but he deferred at this time.  Would agree with pulmonary that it could be helpful to get patient off the injection.  This is critical in view of probable diagnoses here including coronary artery disease and possibly COPD.    Also see evidence of fasting glucose intolerance and dietary should speak to the patient regarding diabetic dietary guidelines.

## 2024-05-03 NOTE — PROGRESS NOTE ADULT - ASSESSMENT
S/p PCI of LAD.  Preserved EF    C/w DAPT  C/w BB  Consider switching to Entresto    Staged PCI of LCX in 4-6 weeks    D/c home.

## 2024-05-03 NOTE — DISCHARGE NOTE NURSING/CASE MANAGEMENT/SOCIAL WORK - NSDCPEFALRISK_GEN_ALL_CORE
For information on Fall & Injury Prevention, visit: [email or web address] OR  [email or web address] OR  https://www.cdc.gov/steadi/patient.html

## 2024-05-03 NOTE — DISCHARGE NOTE PROVIDER - NSDCFUADDAPPT_GEN_ALL_CORE_FT
APPTS ARE READY TO BE MADE: [x ] YES    Best Family or Patient Contact (if needed):    Additional Information about above appointments (if needed):    1: Cardiology - Dr. Reynolds - 2 weeks   2:   3:     Other comments or requests:

## 2024-05-03 NOTE — PROGRESS NOTE ADULT - SUBJECTIVE AND OBJECTIVE BOX
Patient is a 64y old  Male who presents with a chief complaint of shortness of breath (03 May 2024 08:11)    HPI:  Patient is a 64-year-old man with a history of HTN, HDL ( was on amlodipine and lipitor but stopped taking his meds ) has not seen primary care physician for routine physical exams in some time, current heavy  smoker ( 1 pack /day > 50 yrs ) presenting to the ED for shortness of breath for the past 4 days, without an obvious pattern, including nonexertional and exertional. Patient mentioned that it started 4 days ago, intermittent, comes at rest, during the day, on exertion and even at night. Yesterday morning he felt it got worse after he went to work. This is first time it happens. No associated chest pain,fever, chills, cough, sore throat, orthopnea.  Patient sometimes has accompanied episodes of his heart pounding,  Patient was seen at urgent care clinic, where his EKG showed frequent PVCs, and he was sent to the emergency department.  No syncope or near syncope, orthopnea, generalized weakness, nausea, vomiting.  No immobilization or recent surgery, personal or family h/o VTE, hemoptysis, malignancy, or hormone use. Endorses daily ibuprofen use for degenerative aches, but denies melena/hematochezia.     To note, pt saw Dr. Reynolds in 2022 for pre-op clearance  Moh's surgery.   TTE 12/16/2021 showed EF 45-50%. Acc to patient he was told he needs a stress test but never followed up cz he felt fine.     Vitals: T 97.7, HR 55, /74, spo2 96% on RA   Labs: trop 43, pro-BNP 4029  EKG: sinus with PVCs, prolonged QTc, ST and T wave changes ( present on prior EKG )   CXR: RLL opacity, no congestion or effusion    (30 Apr 2024 23:39)      SUBJ:  Patient seen and examined. No chest pain. Access site is clean w/o hematoma.      MEDICATIONS  (STANDING):  aspirin  chewable 81 milliGRAM(s) Oral daily  atorvastatin 80 milliGRAM(s) Oral at bedtime  chlorhexidine 2% Cloths 1 Application(s) Topical daily  clopidogrel Tablet 75 milliGRAM(s) Oral daily  dapagliflozin 10 milliGRAM(s) Oral daily  isosorbide   mononitrate ER Tablet (IMDUR) 30 milliGRAM(s) Oral daily  losartan 25 milliGRAM(s) Oral daily  metoprolol tartrate 25 milliGRAM(s) Oral two times a day  sodium chloride 0.9%. 1000 milliLiter(s) (100 mL/Hr) IV Continuous <Continuous>    MEDICATIONS  (PRN):            Vital Signs Last 24 Hrs  T(C): 36.2 (03 May 2024 08:00), Max: 36.7 (02 May 2024 17:19)  T(F): 97.1 (03 May 2024 08:00), Max: 98 (02 May 2024 17:19)  HR: 68 (03 May 2024 08:00) (51 - 71)  BP: 147/95 (03 May 2024 08:00) (105/65 - 172/97)  BP(mean): 113 (03 May 2024 08:00) (82 - 123)  RR: 20 (03 May 2024 08:00) (14 - 21)  SpO2: 96% (03 May 2024 08:00) (96% - 100%)    Parameters below as of 03 May 2024 08:00  Patient On (Oxygen Delivery Method): room air          PHYSICAL EXAM:    GEN: AAO x 3, NAD  HEENT: NC/AT, PERRL  Neck: No JVD, no bruits  CV: Reg, S1-S2, no murmur  Lungs: CTAB  Abd: Soft, non-tender  Ext: No edema        05-02-24 @ 07:01  -  05-03-24 @ 07:00  --------------------------------------------------------  IN: 720 mL / OUT: 1520 mL / NET: -800 mL    05-03-24 @ 07:01  -  05-03-24 @ 09:00  --------------------------------------------------------  IN: 0 mL / OUT: 300 mL / NET: -300 mL        I&O's Summary    02 May 2024 07:01  -  03 May 2024 07:00  --------------------------------------------------------  IN: 720 mL / OUT: 1520 mL / NET: -800 mL    03 May 2024 07:01  -  03 May 2024 09:00  --------------------------------------------------------  IN: 0 mL / OUT: 300 mL / NET: -300 mL    	    TELEMETRY:    ECG:    TTE:      LABS:                        15.9   11.11 )-----------( 235      ( 03 May 2024 04:44 )             47.9     05-03    140  |  105  |  17  ----------------------------<  106<H>  3.8   |  21  |  1.0    Ca    9.0      03 May 2024 04:44  Mg     2.0     05-03    TPro  6.3  /  Alb  4.2  /  TBili  0.8  /  DBili  x   /  AST  18  /  ALT  13  /  AlkPhos  100  05-03        PT/INR - ( 03 May 2024 04:44 )   PT: 14.20 sec;   INR: 1.24 ratio         PTT - ( 03 May 2024 04:44 )  PTT:62.1 sec      BNP  RADIOLOGY & ADDITIONAL STUDIES:      IMPRESSION AND PLAN:

## 2024-05-03 NOTE — PROGRESS NOTE ADULT - ATTENDING COMMENTS
Plan as outlined above.  D/C home today.  O/P follow-up with Dr. Reynolds.
Plan as outlined above.  Transfer to CCU after LHC/PCI.  Continue DAPT/BB/statin. Add ARB.  Post cath hydration.

## 2024-05-03 NOTE — DISCHARGE NOTE PROVIDER - NSDCMRMEDTOKEN_GEN_ALL_CORE_FT
aspirin 81 mg oral tablet, chewable: 1 tab(s) orally once a day  atorvastatin 80 mg oral tablet: 1 tab(s) orally once a day (at bedtime)  clopidogrel 75 mg oral tablet: 1 tab(s) orally once a day  isosorbide mononitrate 30 mg oral tablet, extended release: 1 tab(s) orally once a day  losartan 25 mg oral tablet: 1 tab(s) orally once a day  Metoprolol Succinate ER 50 mg oral tablet, extended release: 1 tab(s) orally once a day

## 2024-05-03 NOTE — PROGRESS NOTE ADULT - SUBJECTIVE AND OBJECTIVE BOX
Chart reviewed, patient examined. Pertinent results reviewed.  Case discussed with HO; specialist f/u reviewed  HD#4; This admission is the first time I am meeting this patient  S/P [facility]- yesterday. 5/2/24      HPI:    Patient is a 64-year-old man with a history of HTN, HDL ( was on amlodipine and lipitor but stopped taking his meds ) has not seen primary care physician for  some time(12/23), current heavy  smoker ( 1 pack /day > 50 yrs ) presenting to the ED for shortness of breath for the past 4 days, without an obvious pattern, including nonexertional and exertional. Patient mentioned that it started 4 days ago, intermittent, comes at rest, during the day, on exertion and even at night. Yesterday morning he felt it got worse after he went to work. This is first time it happens. No associated chest pain,fever, chills, cough, sore throat, orthopnea.  Patient sometimes has accompanied episodes of his heart pounding,  Patient was seen at urgent care clinic, where his EKG showed frequent PVCs, and he was sent to the emergency department.  No syncope or near syncope, orthopnea, generalized weakness, nausea, vomiting.  No immobilization or recent surgery, personal or family h/o VTE, hemoptysis, malignancy, or hormone use. Endorses daily ibuprofen use for degenerative aches, but denies melena/hematochezia.    He denies clear Symptoms of ACS such as chest pain, and/V/diaphoresis or pulmonary symptoms such as cough fever chills.    To note, pt saw Dr. Reynolds in 2022 for pre-op clearance  Moh's surgery.   TTE 12/16/2021 showed EF 45-50%. Acc to patient he was told he needs a stress test but never followed up because he felt fine.   EKG: sinus with PVCs, prolonged QTc, ST and T wave changes ( present on prior EKG )   CXR: RLL opacity, no congestion or effusion    (30 Apr 2024 23:39)      Cardiology Consult is noted:  65 yo M w HFmrEF, HTN,HLD, tobacco use disorder presents with SOB for 4 days. Reports SOB at rest but no RUSSELL. No associated leg swelling, CP, dizziness, syncope, cough, fever,chills. He went to  where EKG showed frequent PVCs.   Stopped taking all his GDMT a year ago on his own. Was recommended to get a CCTA but he never went for it.   He had Card CTA- POS+, the Cat- yesterday- see results w 1 stent and plan for possibly another.    PAST MEDICAL & SURGICAL HISTORY  Obesity    Hypertension  pt stopped taking meds on his own    Hyperlipidemia    MEÑO (obstructive sleep apnea)  not using machine, machine was retrieved by insurance company    Cigarette smoker within last 12 months  current smoker    No significant past surgical history        FAMILY HISTORY:  FAMILY HISTORY:    [ ] no pertinent family history of premature cardiovascular disease in first degree relatives.  Mother:   Father:   Siblings:     SOCIAL HISTORY:  [x]smoker- long term  works FT for [employer]- , a grandfather      ALLERGIES:  No Known Allergies      MEDICATIONS:  MEDICATIONS  (STANDING):  aspirin  chewable 81 milliGRAM(s) Oral daily  atorvastatin 80 milliGRAM(s) Oral at bedtime  chlorhexidine 2% Cloths 1 Application(s) Topical daily  clopidogrel Tablet 75 milliGRAM(s) Oral daily  dapagliflozin 10 milliGRAM(s) Oral daily  isosorbide   mononitrate ER Tablet (IMDUR) 30 milliGRAM(s) Oral daily  losartan 25 milliGRAM(s) Oral daily  metoprolol tartrate 25 milliGRAM(s) Oral two times a day  sodium chloride 0.9%. 1000 milliLiter(s) (100 mL/Hr) IV Continuous <Continuous>    MEDICATIONS  (PRN):      HOME MEDICATIONS:  Home Medications:      VITALS:   Vital Signs Last 24 Hrs  T(C): 36.2 (03 May 2024 08:00), Max: 36.7 (02 May 2024 17:19)  T(F): 97.1 (03 May 2024 08:00), Max: 98 (02 May 2024 17:19)  HR: 68 (03 May 2024 08:00) (51 - 71)  BP: 147/95 (03 May 2024 08:00) (105/65 - 172/97)  BP(mean): 113 (03 May 2024 08:00) (82 - 123)  RR: 20 (03 May 2024 08:00) (14 - 21)  SpO2: 96% (03 May 2024 08:00) (96% - 100%)    Parameters below as of 03 May 2024 08:00  Patient On (Oxygen Delivery Method): room air          REVIEW OF SYSTEMS:    Negative except as mentioned in HPI;   No leg pains or swelling.        PHYSICAL EXAM:  NEURO: Awake , alert and oriented x4  GEN: Not in acute distress  NECK:  No LN, thyroid or Bruit; + HJR  LUNGS: Clear to auscultation bilaterally   CARDIOVASCULAR: S1/S2 present, RRR , no murmurs or rubs,   ABD: Soft, non-tender, non-distended, +BS  EXT: No TNIA  SKIN: Intact    LABS:                              15.9   11.11 )-----------( 235      ( 03 May 2024 04:44 )             47.9                   15.6   8.98  )-----------( 235      ( 30 Apr 2024 15:46 )             48.6     05-03    140  |  105  |  17  ----------------------------<  106<H>  3.8   |  21  |  1.0    Ca    9.0      03 May 2024 04:44  Mg     2.0     05-03    TPro  6.3  /  Alb  4.2  /  TBili  0.8  /  DBili  x   /  AST  18  /  ALT  13  /  AlkPhos  100  05-03    04-30    141  |  105  |  20  ----------------------------<  86  4.0   |  22  |  1.1    Ca    9.5      30 Apr 2024 15:46  Mg     2.1     04-30    TPro  6.7  /  Alb  4.3  /  TBili  0.4  /  DBili  x   /  AST  15  /  ALT  11  /  AlkPhos  94  04-30              Troponin trend:          RADIOLOGY:  -CXR:  -TTE:  -CCTA:  < from: CT Angio Heart and Coronaries w/ IV Cont (05.01.24 @ 13:05) >  ACC: [id number] EXAM:  CT ANGIO HEART CORONARY IC   ORDERED BY: TRINI CURRAN     PROCEDURE DATE:  05/01/2024          INTERPRETATION:  CLINICAL INDICATION: Chest pain.    COMPARISON: None.    TECHNIQUE: Unenhanced axial ECG-triggered CT was obtained through the   chest. CT angiography of the heart was then performed utilizing ECG-gated   imaging. 3-D reconstruction images were obtained. In preparation for the   examination, the patient received 0.8 mg of sublingual nitroglycerine for   coronary vasodilatation.    CONTRAST:  80 cc of Omnipaque 350    INTERPRETATION:    Calcium Score:    Vessel              Calcium Score    =======================================================  LM:                    293  LAD:                 325  LCX:             241  RCA:                 10  =======================================================  Total:                869      CORONARY CT ANGIOGRAM:    There is a right dominant coronary arterial system.    Left Main Artery: Mixed plaque in the ostial left main with mild stenosis.    Left Anterior Descending Artery: Long area of mixed plaque in the   proximal LAD with moderate stenosis. Mixed plaque in the mid LAD   concerning for severe stenosis. Mixed plaque in the distal LAD concerning   for severe stenosis. Mixed plaque in the second diagonal concerning for   severe stenosis.    Left Circumflex Artery: Total occlusion of the proximal to mid circumflex   after the branch point of a high OM1. Mixed plaque with at least moderate   stenosis in OM1 (evaluation limited by blooming artifact).    Right Coronary Artery: The RCA is not well-visualized and appears to be   totally occluded just after the ostium.    CARDIAC MORPHOLOGY: The left ventricular cavity is dilated, recommend   correlation with echocardiography.  There is no pericardial effusion.   There are aortic valve calcifications.    IMAGED AORTA: The thoracic aorta is normal in caliber.    IMAGED EXTRACARDIAC FINDINGS:  Extracardiac findings section dictated by radiology at the end of the   final report.    IMPRESSION:    Severe multivessel coronary artery disease with heavy coronary artery   calcifications.  Severe LAD disease as described above.  Total occlusion of the proximal to mid circumflex after the branch point   of a high OM1.  The RCA is not well-visualized and appears to be totally occluded just   after the ostium.    The total Agatston coronary artery calcium score equals 869, which   corresponds to 93rd percentile for age, gender and ethnicity.    CAD-RADS 5.      CAD-RADS  Reporting and data system  ______________________________________________________________________    CAD-RADS 0  -    No plaque or stenosis                 Documented absence   of CAD  CAD-RADS 1  -    1-24% Minimal stenosis          Minimal   non-obstructive CAD  CAD-RADS 2  -    25 - 49% Mild stenosis                Mild   non-obstructive CAD  CAD-RADS 3  -    50 - 69% stenosis                        Moderate   Stenosis  CAD-RADS 4  -    A. 70 - 99% Stenosis  or      Severe Stenosis                                  B. Left main >50%  CAD-RADS 5  -    100% (total occlusion)                 Total coronary   occlusion  CAD-RADS N  -    Non-diagnostic study                   Obstructive CAD   cannot be excluded    Hari Davidson M.D., Attending Cardiologist  Jessica Morrison M.D., Attending Radiologist      Extracardiac Findings Only (Cardiac findings were not reviewed by the   radiologist)    Findings: Scattered bilateral interlobular septal thickening and trace  bilateral pleural effusions, possibly reflecting underlying CHF. Trace   pericardial effusion. Multiple subcentimeter and mildly enlarged   mediastinal lymph nodes are nonspecific and possibly reactive.    Cardiac findings independently dictated bythe Cardiology Attending as   above.    --- End of Report ---        JESSICA MORRISON MD; Attending Radiologist  This document has been electronically signed. May  1 2024  4:52PM    < end of copied text >    -STRESS TEST:  -CATHETERIZATION:    ECG:    TELEMETRY EVENTS:   Chart reviewed, patient examined. Pertinent results reviewed.  Case discussed with HO; specialist f/u reviewed  HD#4; This admission is the first time I am meeting this patient  S/P [facility]- yesterday. 5/2/24      HPI:    Patient is a 64-year-old man with a history of HTN, HDL ( was on amlodipine and lipitor but stopped taking his meds ) has not seen primary care physician for  some time(12/23), current heavy  smoker ( 1 pack /day > 50 yrs ) presenting to the ED for shortness of breath for the past 4 days, without an obvious pattern, including nonexertional and exertional. Patient mentioned that it started 4 days ago, intermittent, comes at rest, during the day, on exertion and even at night. Yesterday morning he felt it got worse after he went to work. This is first time it happens. No associated chest pain,fever, chills, cough, sore throat, orthopnea.  Patient sometimes has accompanied episodes of his heart pounding,  Patient was seen at urgent care clinic, where his EKG showed frequent PVCs, and he was sent to the emergency department.  No syncope or near syncope, orthopnea, generalized weakness, nausea, vomiting.  No immobilization or recent surgery, personal or family h/o VTE, hemoptysis, malignancy, or hormone use. Endorses daily ibuprofen use for degenerative aches, but denies melena/hematochezia.    He denies clear Symptoms of ACS such as chest pain, and/V/diaphoresis or pulmonary symptoms such as cough fever chills.    To note, pt saw Dr. Reynolds in 2022 for pre-op clearance  Moh's surgery.   TTE 12/16/2021 showed EF 45-50%. Acc to patient he was told he needs a stress test but never followed up because he felt fine.   EKG: sinus with PVCs, prolonged QTc, ST and T wave changes ( present on prior EKG )   CXR: RLL opacity, no congestion or effusion    (30 Apr 2024 23:39)      Cardiology Consult is noted:  65 yo M w HFmrEF, HTN,HLD, tobacco use disorder presents with SOB for 4 days. Reports SOB at rest but no RUSSELL. No associated leg swelling, CP, dizziness, syncope, cough, fever,chills. He went to  where EKG showed frequent PVCs.   Stopped taking all his GDMT a year ago on his own. Was recommended to get a CCTA but he never went for it.   He had Card CTA- POS+, the Cat- yesterday- see results w 1 stent and plan for possibly another.    PAST MEDICAL & SURGICAL HISTORY  Obesity    Hypertension  pt stopped taking meds on his own    Hyperlipidemia    MEÑO (obstructive sleep apnea)  not using machine, machine was retrieved by insurance company    Cigarette smoker within last 12 months  current smoker    No significant past surgical history        FAMILY HISTORY:  FAMILY HISTORY:    [ ] no pertinent family history of premature cardiovascular disease in first degree relatives.  Mother:   Father:   Siblings:     SOCIAL HISTORY:  [x]smoker- long term  works FT for [employer]- , a grandfather      ALLERGIES:  No Known Allergies      MEDICATIONS:  MEDICATIONS  (STANDING):  aspirin  chewable 81 milliGRAM(s) Oral daily  atorvastatin 80 milliGRAM(s) Oral at bedtime  chlorhexidine 2% Cloths 1 Application(s) Topical daily  clopidogrel Tablet 75 milliGRAM(s) Oral daily  dapagliflozin 10 milliGRAM(s) Oral daily  isosorbide   mononitrate ER Tablet (IMDUR) 30 milliGRAM(s) Oral daily  losartan 25 milliGRAM(s) Oral daily  metoprolol tartrate 25 milliGRAM(s) Oral two times a day  sodium chloride 0.9%. 1000 milliLiter(s) (100 mL/Hr) IV Continuous <Continuous>    MEDICATIONS  (PRN):      HOME MEDICATIONS:  Home Medications:      VITALS:   Vital Signs Last 24 Hrs  T(C): 36.2 (03 May 2024 08:00), Max: 36.7 (02 May 2024 17:19)  T(F): 97.1 (03 May 2024 08:00), Max: 98 (02 May 2024 17:19)  HR: 68 (03 May 2024 08:00) (51 - 71)  BP: 147/95 (03 May 2024 08:00) (105/65 - 172/97)  BP(mean): 113 (03 May 2024 08:00) (82 - 123)  RR: 20 (03 May 2024 08:00) (14 - 21)  SpO2: 96% (03 May 2024 08:00) (96% - 100%)    Parameters below as of 03 May 2024 08:00  Patient On (Oxygen Delivery Method): room air          REVIEW OF SYSTEMS:    Negative except as mentioned in HPI;   No leg pains or swelling.        PHYSICAL EXAM:  NEURO: Awake , alert and oriented x4  GEN: Not in acute distress  NECK:  No LN, thyroid or Bruit; + HJR  LUNGS: Clear to auscultation bilaterally, no R, Rh, Wh   CARDIOVASCULAR:  RRR , no murmurs or rubs,   ABD: Soft, non-tender, non-distended, +BS; R groin puncture site- Not swollen or tender  EXT: No TINA  SKIN: Intact    LABS:                              15.9   11.11 )-----------( 235      ( 03 May 2024 04:44 )             47.9                   15.6   8.98  )-----------( 235      ( 30 Apr 2024 15:46 )             48.6     05-03    140  |  105  |  17  ----------------------------<  106<H>  3.8   |  21  |  1.0    Ca    9.0      03 May 2024 04:44  Mg     2.0     05-03    TPro  6.3  /  Alb  4.2  /  TBili  0.8  /  DBili  x   /  AST  18  /  ALT  13  /  AlkPhos  100  05-03    04-30    141  |  105  |  20  ----------------------------<  86  4.0   |  22  |  1.1    Ca    9.5      30 Apr 2024 15:46  Mg     2.1     04-30    TPro  6.7  /  Alb  4.3  /  TBili  0.4  /  DBili  x   /  AST  15  /  ALT  11  /  AlkPhos  94  04-30              Troponin trend:          RADIOLOGY:  -CXR:  -TTE:  -CCTA:  < from: CT Angio Heart and Coronaries w/ IV Cont (05.01.24 @ 13:05) >  ACC: [id number] EXAM:  CT ANGIO HEART CORONARY IC   ORDERED BY: TRINI CURRAN     PROCEDURE DATE:  05/01/2024          INTERPRETATION:  CLINICAL INDICATION: Chest pain.    COMPARISON: None.    TECHNIQUE: Unenhanced axial ECG-triggered CT was obtained through the   chest. CT angiography of the heart was then performed utilizing ECG-gated   imaging. 3-D reconstruction images were obtained. In preparation for the   examination, the patient received 0.8 mg of sublingual nitroglycerine for   coronary vasodilatation.    CONTRAST:  80 cc of Omnipaque 350    INTERPRETATION:    Calcium Score:    Vessel              Calcium Score    =======================================================  LM:                    293  LAD:                 325  LCX:             241  RCA:                 10  =======================================================  Total:                869      CORONARY CT ANGIOGRAM:    There is a right dominant coronary arterial system.    Left Main Artery: Mixed plaque in the ostial left main with mild stenosis.    Left Anterior Descending Artery: Long area of mixed plaque in the   proximal LAD with moderate stenosis. Mixed plaque in the mid LAD   concerning for severe stenosis. Mixed plaque in the distal LAD concerning   for severe stenosis. Mixed plaque in the second diagonal concerning for   severe stenosis.    Left Circumflex Artery: Total occlusion of the proximal to mid circumflex   after the branch point of a high OM1. Mixed plaque with at least moderate   stenosis in OM1 (evaluation limited by blooming artifact).    Right Coronary Artery: The RCA is not well-visualized and appears to be   totally occluded just after the ostium.    CARDIAC MORPHOLOGY: The left ventricular cavity is dilated, recommend   correlation with echocardiography.  There is no pericardial effusion.   There are aortic valve calcifications.    IMAGED AORTA: The thoracic aorta is normal in caliber.    IMAGED EXTRACARDIAC FINDINGS:  Extracardiac findings section dictated by radiology at the end of the   final report.    IMPRESSION:    Severe multivessel coronary artery disease with heavy coronary artery   calcifications.  Severe LAD disease as described above.  Total occlusion of the proximal to mid circumflex after the branch point   of a high OM1.  The RCA is not well-visualized and appears to be totally occluded just   after the ostium.    The total Agatston coronary artery calcium score equals 869, which   corresponds to 93rd percentile for age, gender and ethnicity.    CAD-RADS 5.      CAD-RADS  Reporting and data system  ______________________________________________________________________    CAD-RADS 0  -    No plaque or stenosis                 Documented absence   of CAD  CAD-RADS 1  -    1-24% Minimal stenosis          Minimal   non-obstructive CAD  CAD-RADS 2  -    25 - 49% Mild stenosis                Mild   non-obstructive CAD  CAD-RADS 3  -    50 - 69% stenosis                        Moderate   Stenosis  CAD-RADS 4  -    A. 70 - 99% Stenosis  or      Severe Stenosis                                  B. Left main >50%  CAD-RADS 5  -    100% (total occlusion)                 Total coronary   occlusion  CAD-RADS N  -    Non-diagnostic study                   Obstructive CAD   cannot be excluded    Hari Davidson M.D., Attending Cardiologist  Jessica Morrison M.D., Attending Radiologist      Extracardiac Findings Only (Cardiac findings were not reviewed by the   radiologist)    Findings: Scattered bilateral interlobular septal thickening and trace  bilateral pleural effusions, possibly reflecting underlying CHF. Trace   pericardial effusion. Multiple subcentimeter and mildly enlarged   mediastinal lymph nodes are nonspecific and possibly reactive.    Cardiac findings independently dictated bythe Cardiology Attending as   above.    --- End of Report ---        JESSICA MORRISON MD; Attending Radiologist  This document has been electronically signed. May  1 2024  4:52PM    < end of copied text >    -STRESS TEST:  -CATHETERIZATION:    ECG:    TELEMETRY EVENTS:

## 2024-05-03 NOTE — DISCHARGE NOTE PROVIDER - NSDCCPCAREPLAN_GEN_ALL_CORE_FT
PRINCIPAL DISCHARGE DIAGNOSIS  Diagnosis: Shortness of breath  Assessment and Plan of Treatment: During your hospitalization, you were found to have coronary artery disease and underwent angiogram.  Your angiogram demonstrated a blockage, so one stent was placed to alleviate this blockage. Additionally, you were started on two medications called aspirin and Plavix (clopidogrel) to prevent your stents from becoming blocked in the future. You were also started on a cholesterol-lowering medication (atorvastatin) and a heart-rate lowering medication (metoprolol), which have been proven to show long-term cardiac benefits in patients with coronary artery disease. Additionally, you were started on losartan. Please take these medications as prescribed and discuss with your cardiologist how long you will need to take them for.  Please schedule an outpatient appointment with Dr. Reynolds within two weeks after discharge for follow-up.   If you have any severe repeat chest pain, difficulty breathing, or bleeding from the catheterization access site, please return to the hospital IMMEDIATELY for further evaluation.      SECONDARY DISCHARGE DIAGNOSES  Diagnosis: Abnormal EKG  Assessment and Plan of Treatment:     Diagnosis: Elevated troponin  Assessment and Plan of Treatment:

## 2024-05-03 NOTE — PROGRESS NOTE ADULT - TIME BILLING
spent  60   minutes on this patient's case:  25  minutes w this new patient,  20  minutes reviewing the chart,    and  20  minutes speaking to the HO, RN or family, also coordinating care and documenting all on His first day of his admission. spent  40   minutes on this patient's case:  15  minutes w this new patient,  13  minutes reviewing the chart,    and  12  minutes speaking to the HO, RN or family, also coordinating care and documenting all on His last day of admission.

## 2024-05-03 NOTE — DISCHARGE NOTE PROVIDER - NSDCFUADDINST_GEN_ALL_CORE_FT
- Do not drive or operate heavy machinery for 24 hours.  - Limit your physical or any strenuous activity for 2 weeks after angioplasty and 48 hours for angiogram. Support the groin site with your hand when you sneeze or cough. No heavy lifting ( objects more then 10 pounds).  - For wrist access, avoid using affected arm for 24 hours after removal of dressing and avoid heavy lifting for 7 days.  Hygiene:  - After 24 hours, you may shower and remove the dressing from the site. Do not tub bathe for one week. Do not rub or apply lotion, cream, powder to the affected site. Leave it open to air.   Diet:   - You may resume your diet. Low Sodium. Low Fat, Low Cholesterol.  If Diabetic - Carbohydrate Consistent Diet.      - Drink extra fluid unless otherwise advised.   Special Instructions:  - Bruising or black and blue at the puncture site is possible.  - If there is bleeding from the puncture site (groin or wrist) apply direct firm pressure on the site and call 911.  - Any sudden swelling, redness, fever, discharge or severe pain, call your physician or call the cath lab.   - If you notice any scab formation in the area avoid touching the site and allow it to heal.  - Numbness or "pins and needle" sensation in the affected arm, hand, leg or if the affected site become cool to touch or pale that persist for extended period of time call your physician immediately to be checked.  - If you developed chest pain, not relieved by your usual routine medication, fainting, lethargy, weakness, report to the nearest emergency room.   - Inform your Dentist or Surgeon if you are taking Aspirin or any antiplatelet medications. Report any bleeding in your urine or stool.    - Do not drive or operate heavy machinery for 24 hours.  - Limit your physical or any strenuous activity for 2 weeks after angioplasty and 48 hours for angiogram. Support the groin site with your hand when you sneeze or cough. No heavy lifting ( objects more then 10 pounds).  - For wrist access, avoid using affected arm for 24 hours after removal of dressing and avoid heavy lifting for 7 days.  Hygiene:  - After 24 hours, you may shower and remove the dressing from the site. Do not tub bathe for one week. Do not rub or apply lotion, cream, powder to the affected site. Leave it open to air.   Diet:   - You may resume your diet. Low Sodium. Low Fat, Low Cholesterol.  If Diabetic - Carbohydrate Consistent Diet.      - Drink extra fluid unless otherwise advised.   Special Instructions:  - Bruising or black and blue at the puncture site is possible.  - If there is bleeding from the puncture site (groin or wrist) apply direct firm pressure on the site and call 911.  - Any sudden swelling, redness, fever, discharge or severe pain, call your physician or call the cath lab.   - If you notice any scab formation in the area avoid touching the site and allow it to heal.  - Numbness or "pins and needle" sensation in the affected arm, hand, leg or if the affected site become cool to touch or pale that persist for extended period of time call your physician immediately to be checked.  - If you developed chest pain, not relieved by your usual routine medication, fainting, lethargy, weakness, report to the nearest emergency room.   - Inform your Dentist or Surgeon if you are taking Aspirin or any antiplatelet medications. Report any bleeding in your urine or stool.

## 2024-05-03 NOTE — DISCHARGE NOTE NURSING/CASE MANAGEMENT/SOCIAL WORK - NSDCVIVACCINE_GEN_ALL_CORE_FT
Tdap; 29-Nov-2021 20:08; Azucena Wheat (RN); [employer]; [id number] (Exp. Date: 09-Sep-2023); IntraMuscular; Deltoid Left.; 0.5 milliLiter(s); VIS (VIS Published: 09-May-2013, VIS Presented: 29-Nov-2021);

## 2024-05-03 NOTE — PROGRESS NOTE ADULT - ASSESSMENT
Patient is a 64-year-old man with a history of HTN, HDL ( was on amlodipine and lipitor but stopped taking his meds ) has not seen primary care physician for routine physical exams in some time, current heavy  smoker ( 1 pack /day > 50 yrs ) presenting to the ED for shortness of breath for the past 4 days, without an obvious pattern, including nonexertional and exertional.    #CAD s/p PCI of mid LAD FFR positive lesion , RCA  with collaterals from left, LCX ostial 80% disease, plan for staged PCI in 4 weeks   #Acute on chronic systolic heart failure, LVEDP 30 mmHg, EF 30-35%   #HTN/HLD  #Tobacco use disorder    IMPRESSION:    PLAN:    CNS:   - no depressants  - pain control PRN    HEENT:   -Oral care    PULMONARY:    -HOB @ 45 degrees    CARDIOVASCULAR:   - CCTA was strongly positive for multivessel coronary artery disease.  - PCI on midLAD with DESx1 FFR positive lesion , RCA  with collaterals from left, LCX ostial 80% disease, plan for staged PCI in 4 weeks   - Started on aspirin, plavix, atorvastatin  - C/w losartan, transition to entresto as OP   - Pt will follow up with Dr. Reynolds in 2w and will get staged PCI in 4-6 weeks    GI:   -GI prophylaxis.    -oral feeds    RENAL:    -Follow up lytes.  Correct as needed.     INFECTIOUS DISEASE:   - No signs/symptoms of infectious etiology at this time    HEMATOLOGICAL:    -DVT prophylaxis.  - On aspirin/plavis    ENDOCRINE:    -Follow up FS.  -insulin sliding scale if needed    MUSCULOSKELETAL:   -follow instructions by interventional cardiology

## 2024-05-03 NOTE — DISCHARGE NOTE PROVIDER - PROVIDER TOKENS
PROVIDER:[TOKEN:[[id number]],FOLLOWUP:[2 weeks]],PROVIDER:[TOKEN:[[id number]],FOLLOWUP:[1 week]]

## 2024-05-03 NOTE — DISCHARGE NOTE PROVIDER - CARE PROVIDER_API CALL
Atilio Reynolds  Cardiology  [address] [place] [address], [address]  [place] [address]  Phone: [phone]  Fax: [phone]  Follow Up Time: 2 weeks    Barron Avelar  Internal Medicine  [address] [place] [address]  Phone: [phone]  Fax: [phone]  Follow Up Time: 1 week

## 2024-05-03 NOTE — DISCHARGE NOTE PROVIDER - HOSPITAL COURSE
Patient is a 64-year-old man with a history of HTN, HDL ( was on amlodipine and lipitor but stopped taking his meds ) has not seen primary care physician for routine physical exams in some time, current heavy  smoker ( 1 pack /day > 50 yrs ) presenting to the ED for shortness of breath for the past 4 days, without an obvious pattern, including nonexertional and exertional. Patient mentioned that it started 4 days ago, intermittent, comes at rest, during the day, on exertion and even at night. Yesterday morning he felt it got worse after he went to work. This is first time it happens. No associated chest pain,fever, chills, cough, sore throat, orthopnea.  Patient sometimes has accompanied episodes of his heart pounding,  Patient was seen at urgent care clinic, where his EKG showed frequent PVCs, and he was sent to the emergency department.  No syncope or near syncope, orthopnea, generalized weakness, nausea, vomiting.  No immobilization or recent surgery, personal or family h/o VTE, hemoptysis, malignancy, or hormone use. Endorses daily ibuprofen use for degenerative aches, but denies melena/hematochezia.     To note, pt saw Dr. Reynolds in 2022 for pre-op clearance  Moh's surgery.   TTE 12/16/2021 showed EF 45-50%. Acc to patient he was told he needs a stress test but never followed up cz he felt fine.     Vitals: T 97.7, HR 55, /74, spo2 96% on RA   Labs: trop 43, pro-BNP 4029  EKG: sinus with PVCs, prolonged QTc, ST and T wave changes ( present on prior EKG )   CXR: RLL opacity, no congestion or effusion     He was admitted for shortness of breath.     #CAD s/p PCI   #HTN/HLD  #Tobacco use disorder  - CCTA was strongly positive for multivessel coronary artery disease.  - PCI on midLAD with DESx1 FFR positive lesion , RCA  with collaterals from left, LCX ostial 80% disease, plan for staged PCI in 4 weeks   - Started on aspirin, plavix, atorvastatin  - C/w losartan, transition to entresto as OP   - Pt will follow up with Dr. Reynolds in 2w and will get staged PCI in 4-6 weeks    Patient is medically stable and ready for discharge.

## 2024-05-03 NOTE — PROGRESS NOTE ADULT - SUBJECTIVE AND OBJECTIVE BOX
CHIEF COMPLAINT:  Patient is a 64y old  Male who presents with a chief complaint of shortness of breath (03 May 2024 12:33)      INTERVAL HISTORY/OVERNIGHT EVENTS:  No overnight events.     ======================  MEDICATIONS:  aspirin  chewable 81 milliGRAM(s) Oral daily  atorvastatin 80 milliGRAM(s) Oral at bedtime  chlorhexidine 2% Cloths 1 Application(s) Topical daily  clopidogrel Tablet 75 milliGRAM(s) Oral daily  isosorbide   mononitrate ER Tablet (IMDUR) 30 milliGRAM(s) Oral daily  metoprolol tartrate 25 milliGRAM(s) Oral two times a day    DRIPS:  sodium chloride 0.9%. 1000 milliLiter(s) (100 mL/Hr) IV Continuous <Continuous>    PRN:       ======================  PHYSICAL EXAMINATION:  GEN:  nad.   HEENT:  eomi. ncat  PULM:  b/l lung sounds   CARD: s1, s2  ABD: +bs. ntnd  EXT:  no new rashes. Groin area no swelling, erythema   NEURO:  no new focal deficits.   ======================  OBJECTIVE:        VS:  T(F): 97.6 (05-03 @ 12:00), Max: 98 (05-02 @ 17:19)  HR: 56 (05-03 @ 13:00) (51 - 73)  BP: 124/78 (05-03 @ 13:00) (105/65 - 172/97)  RR: 20 (05-03 @ 13:00) (14 - 21)  SpO2: 97% (05-03 @ 13:00) (96% - 100%)  CVP(mm Hg): --  CO: --  CI: --  PA: --  PCWP: --    I/O:      05-01 @ 07:01  -  05-02 @ 07:00  --------------------------------------------------------  IN: 220 mL / OUT: 0 mL / NET: 220 mL    05-02 @ 07:01  -  05-03 @ 07:00  --------------------------------------------------------  IN: 720 mL / OUT: 1520 mL / NET: -800 mL    05-03 @ 07:01  -  05-03 @ 13:56  --------------------------------------------------------  IN: 440 mL / OUT: 500 mL / NET: -60 mL        Weight trend:  Weight (kg): 72.6 (05-03)    ======================    LABS:                          15.9   11.11 )-----------( 235      ( 03 May 2024 04:44 )             47.9     05-03    140  |  105  |  17  ----------------------------<  106<H>  3.8   |  21  |  1.0    Ca    9.0      03 May 2024 04:44  Mg     2.0     05-03    TPro  6.3  /  Alb  4.2  /  TBili  0.8  /  DBili  x   /  AST  18  /  ALT  13  /  AlkPhos  100  05-03    LIVER FUNCTIONS - ( 03 May 2024 04:44 )  Alb: 4.2 g/dL / Pro: 6.3 g/dL / ALK PHOS: 100 U/L / ALT: 13 U/L / AST: 18 U/L / GGT: x           PT/INR - ( 03 May 2024 04:44 )   PT: 14.20 sec;   INR: 1.24 ratio         PTT - ( 03 May 2024 04:44 )  PTT:62.1 sec          Urinalysis Basic - ( 03 May 2024 04:44 )    Color: x / Appearance: x / SG: x / pH: x  Gluc: 106 mg/dL / Ketone: x  / Bili: x / Urobili: x   Blood: x / Protein: x / Nitrite: x   Leuk Esterase: x / RBC: x / WBC x   Sq Epi: x / Non Sq Epi: x / Bacteria: x        Cultures:         CHIEF COMPLAINT:  Patient is a 64y old  Male who presents with a chief complaint of shortness of breath (03 May 2024 12:33)      INTERVAL HISTORY/OVERNIGHT EVENTS:  No overnight events.     ======================  MEDICATIONS:  aspirin  chewable 81 milliGRAM(s) Oral daily  atorvastatin 80 milliGRAM(s) Oral at bedtime  chlorhexidine 2% Cloths 1 Application(s) Topical daily  clopidogrel Tablet 75 milliGRAM(s) Oral daily  isosorbide   mononitrate ER Tablet (IMDUR) 30 milliGRAM(s) Oral daily  metoprolol tartrate 25 milliGRAM(s) Oral two times a day    DRIPS:  sodium chloride 0.9%. 1000 milliLiter(s) (100 mL/Hr) IV Continuous <Continuous>    PRN:       ======================  PHYSICAL EXAMINATION:  GEN:  nad.   HEENT:  eomi. ncat  PULM:  b/l lung sounds   CARD: s1, s2  ABD: +bs. ntnd  EXT:  no new rashes. Groin area no swelling or erythema   NEURO:  no new focal deficits.   ======================  OBJECTIVE:        VS:  T(F): 97.6 (05-03 @ 12:00), Max: 98 (05-02 @ 17:19)  HR: 56 (05-03 @ 13:00) (51 - 73)  BP: 124/78 (05-03 @ 13:00) (105/65 - 172/97)  RR: 20 (05-03 @ 13:00) (14 - 21)  SpO2: 97% (05-03 @ 13:00) (96% - 100%)  CVP(mm Hg): --  CO: --  CI: --  PA: --  PCWP: --    I/O:      05-01 @ 07:01  -  05-02 @ 07:00  --------------------------------------------------------  IN: 220 mL / OUT: 0 mL / NET: 220 mL    05-02 @ 07:01  -  05-03 @ 07:00  --------------------------------------------------------  IN: 720 mL / OUT: 1520 mL / NET: -800 mL    05-03 @ 07:01  -  05-03 @ 13:56  --------------------------------------------------------  IN: 440 mL / OUT: 500 mL / NET: -60 mL        Weight trend:  Weight (kg): 72.6 (05-03)    ======================    LABS:                          15.9   11.11 )-----------( 235      ( 03 May 2024 04:44 )             47.9     05-03    140  |  105  |  17  ----------------------------<  106<H>  3.8   |  21  |  1.0    Ca    9.0      03 May 2024 04:44  Mg     2.0     05-03    TPro  6.3  /  Alb  4.2  /  TBili  0.8  /  DBili  x   /  AST  18  /  ALT  13  /  AlkPhos  100  05-03    LIVER FUNCTIONS - ( 03 May 2024 04:44 )  Alb: 4.2 g/dL / Pro: 6.3 g/dL / ALK PHOS: 100 U/L / ALT: 13 U/L / AST: 18 U/L / GGT: x           PT/INR - ( 03 May 2024 04:44 )   PT: 14.20 sec;   INR: 1.24 ratio         PTT - ( 03 May 2024 04:44 )  PTT:62.1 sec          Urinalysis Basic - ( 03 May 2024 04:44 )    Color: x / Appearance: x / SG: x / pH: x  Gluc: 106 mg/dL / Ketone: x  / Bili: x / Urobili: x   Blood: x / Protein: x / Nitrite: x   Leuk Esterase: x / RBC: x / WBC x   Sq Epi: x / Non Sq Epi: x / Bacteria: x        Cultures:

## 2024-05-03 NOTE — DISCHARGE NOTE NURSING/CASE MANAGEMENT/SOCIAL WORK - PATIENT PORTAL LINK FT
You can access the FollowMyHealth Patient Portal offered by [facility] by registering at the following website: [email or web address]. By joining [facility]’s FollowMyHealth portal, you will also be able to view your health information using other applications (apps) compatible with our system.

## 2024-05-06 NOTE — CHART NOTE - NSCHARTNOTESELECT_GEN_ALL_CORE
Event Note
Leave of absence from work/Event Note
Non Clinical Note/Event Note
Cath/Event Note
pre-cath/Event Note

## 2024-05-08 PROBLEM — I50.22 CHRONIC SYSTOLIC HEART FAILURE: Status: ACTIVE | Noted: 2024-01-01

## 2024-05-08 PROBLEM — Z87.891 FORMER SMOKER: Status: ACTIVE | Noted: 2024-01-01

## 2024-05-08 PROBLEM — I10 HYPERTENSION: Status: ACTIVE | Noted: 2021-12-09

## 2024-05-08 PROBLEM — J44.9 COPD (CHRONIC OBSTRUCTIVE PULMONARY DISEASE): Status: ACTIVE | Noted: 2021-12-19

## 2024-05-08 PROBLEM — I25.10 CAD S/P PERCUTANEOUS CORONARY ANGIOPLASTY: Status: ACTIVE | Noted: 2024-01-01

## 2024-05-08 PROBLEM — E78.5 HYPERLIPIDEMIA: Status: ACTIVE | Noted: 2021-12-19

## 2024-05-08 PROBLEM — I50.22 HEART FAILURE WITH MID-RANGE EJECTION FRACTION: Status: ACTIVE | Noted: 2022-11-28

## 2024-05-11 PROBLEM — Z86.79 HISTORY OF ISCHEMIC CARDIOMYOPATHY: Status: ACTIVE | Noted: 2024-01-01

## 2024-05-11 NOTE — REASON FOR VISIT
[Symptom and Test Evaluation] : symptom and test evaluation [Cardiac Failure] : cardiac failure [FreeTextEntry1] : Mr. RIK HELMS is a 64 year old man with PMHx of chronic HFrEF (ICM EF 36%), CAD s/p mid LAD PCI 5/3/24, HTN, HLD, AI, and former smoker who is presenting for follow up. He had been lost to follow up, but recently presented to the ED with chest pain. He underwent CCTA, which was abnormal. LHC showed multivessel disease. Distal LAD was a poor LIMA target, so he underwent PCI to mid LAD. RCA is  with collaterals from the left system. Plan is for him to go for staged PCI to LCx. TTE demonstrated moderately reduced LV function.   He currently feels well. He has minimal chest pain, but it has improved. He has no dyspnea. He has quit smoking.   TTE 5/1/24 Summary:  1. Moderately decreased global left ventricular systolic function.  2. Multiple left ventricular regional wall motion abnormalities exist. See wall motion findings.  3. LV Ejection Fraction by Krause's Method with a biplane EF of 36 %.  4. Mildly increased LV wall thickness.  5. Spectral Doppler shows pseudonormal pattern of left ventricular myocardial filling (Grade II diastolic dysfunction).  6. Mildly enlarged left atrium.  7. Normal right atrial size.  8. Mild to moderate mitral valve regurgitation.  9. Moderate thickening and calcification of the anterior mitral valve leaflet. 10. Mild tricuspid regurgitation. 11. Mild aortic regurgitation. 12. Sclerotic aortic valve with decreased opening.  TTE 4/6/2023 CONCLUSIONS: 1.The left ventricular systolic function is low-normal with an ejection fraction of 53 %. 2.Mild left ventricular hypertrophy. 3.Entire lateral wall is hypokinetic. 4.Normal right ventricular cavity size and normal systolic function. 5.Mild-to-moderate aortic regurgitation. 6.Pulmonary artery systolic pressure could not be estimated. 7.The proximal ascending aorta is mildly dilated at 3.7cm (inedxed 2.07 cm/m^2). 8.Compared to the transthoracic echocardiogram performed on 12/16/2021 the LVEF has increased   [facility] 5/3/24 Intervention: Successful PCI of mid-LAD with balloon angioplasty s/p LIDIA x 1 Implants: SYNERGY XD 3.5X24 mm AUC: 7 FINDINGS: Coronary Dominance: Left LM: 40% disease LAD: Proximal LAD 40% disease. mid-LAD 80% lesion hemodynamically significant by FFR (0.76) s/p PCI. Distal LAD diffuse disease not amenable to intervention. Poor target for LIMA distally. D1: Small vessel. 90% ostial disease not amenable to intervention. Mid vessel -  supplied by collaterals. CX: Ostial 80% disease. Distal  supplied by collaterals. OM1: Prox 90% disease OM2: Small vessel. Moderate diffuse disease. RCA: . Supplied by left to right collaterals

## 2024-05-11 NOTE — ASSESSMENT
[FreeTextEntry1] : Mr. RIK HELMS is a 64 year old man with PMHx of chronic HFrEF (ICM EF 36%), CAD s/p mid LAD PCI 5/3/24, HTN, HLD, AI, and former smoker who is presenting for follow up.  #Chronic HFrEF (ICM, EF 36%) #CAD s/p mLAD PCI 5/3/24, with plan for staged PCI to LCx #HTN, BP goal <130/80 #HLD, LDL goal <70 #AI #Former smoker  -Reviewed previous TTEs (independently), [facility], labs, ECG -Had lengthy discussion about CAD and ICM, including diagnosis, management and prognosis -Order basic and cardiometabolic labs: CMP, CBC, lipids, A1c, Lp(a), hsCRP, apoB, TSH, proBNP -Order [facility] staged PCI of LCx with Dr Grossman  -Will recheck LV function after 3 months of GDMT and revascularization -Continue DAPT with ASA/plavix -Continue atorvastatin 80 daily -Continue imdur 30 daily for anti-anginal for now, pending [facility] -Stop losartan and start entresto for chronic HFrEF and HTN. BP is above goal today.  -Start jardiance 10 daily for HFrEF -Continue metoprolol succinate 50 daily for HFrEF  -Encouraged improved lifestyle modifications with these recommendations below: -Plant-based and Mediterranean diets, along with increased fruit, nut, vegetable, legume, and lean vegetable or animal protein (preferably fish) consumption -Engage in at least 150 minutes per week of accumulated moderate-intensity aerobic physical activity or 75 minutes per week of vigorous-intensity aerobic physical activity  Time in encounter, including face to face visit and time reviewing chart:  50 minutes  Atilio Reynolds MD, FACC Non-Invasive Cardiology [address] Office: [phone]

## 2024-05-31 ENCOUNTER — APPOINTMENT (OUTPATIENT)
Dept: CARDIOLOGY | Facility: CLINIC | Age: 64
End: 2024-05-31

## 2024-06-26 ENCOUNTER — APPOINTMENT (OUTPATIENT)
Dept: CARDIOLOGY | Facility: CLINIC | Age: 64
End: 2024-06-26

## 2024-12-18 NOTE — PATIENT PROFILE ADULT - HAS THE PATIENT USED TOBACCO IN THE PAST 30 DAYS?
Detail Level: Zone Render In Strict Bullet Format?: No Discontinue Regimen: Fluocinonide solution Continue Regimen: Clindamycin Sol BID, CLN QD. Yes

## 2025-03-18 NOTE — H&P ADULT - ASSESSMENT
Subjective:   Patient ID: Monet Fletcher is a 38 year old male.    HPI  Patient doing well with medication.  States no more itching to feet.  Without problems or concerns.  History/Other:   Review of Systems  Current Outpatient Medications   Medication Sig Dispense Refill    terbinafine 250 MG Oral Tab Take 1 tablet (250 mg total) by mouth daily. 90 tablet 0     Allergies:Allergies[1]    Objective:   Physical Exam  Cardiovascular:      Rate and Rhythm: Normal rate and regular rhythm.      Pulses: Normal pulses.      Heart sounds: Normal heart sounds.   Pulmonary:      Effort: Pulmonary effort is normal.      Breath sounds: Normal breath sounds.   Abdominal:      General: Bowel sounds are normal.      Palpations: Abdomen is soft.   Neurological:      General: No focal deficit present.      Mental Status: He is oriented to person, place, and time.     /80   Pulse 82   Temp 97.1 °F (36.2 °C) (Temporal)   Ht 5' 7\" (1.702 m)   Wt 225 lb (102.1 kg)   SpO2 97%   BMI 35.24 kg/m²       Assessment & Plan:   1. Fungal infection of nail    2. Medication side effect        Orders Placed This Encounter   Procedures    Comp Metabolic Panel (14) [E]       Meds This Visit:  Requested Prescriptions      No prescriptions requested or ordered in this encounter       Imaging & Referrals:  None         [1] No Known Allergies     Patient is a 64-year-old man with a history of HTN, HDL ( was on amlodipine and lipitor but stopped taking his meds ) has not seen primary care physician for routine physical exams in some time, current heavy  smoker ( 1 pack /day > 50 yrs ) presenting to the ED for shortness of breath for the past 4 days, without an obvious pattern, including nonexertional and exertional.    # Shortness of breath   #R/O ACS   #R/O CHF   #Possible RLL PNA   - Vitals: T 97.7, HR 55, /74, spo2 96% on RA   - Labs: trop 43, pro-BNP 4029  - EKG: sinus with PVCs, prolonged QTc, ST and T wave changes ( present on prior EKG )   - CXR: RLL opacity, no congestion or effusion   - TTE 12/16/2021 showed    1.Left ventricular ejection fraction is estimated at 45-50%.  2.Mildly decreased global left ventricular systolic function.  3.Spectral Doppler shows impaired relaxation pattern of left ventricular myocardial filling   (Grade I diastolic dysfunction).  4.Mildly calcified aortic valve without stenosis.  5.Trivial aortic regurgitation.  - pt saw Dr. Reynolds in 2022 for pre-op clearance  Moh's surgery., recommended stress test as per pt but lost to f/u   - no leukocytosis, fever, cough, or other sxs of PNA     Plan:   - monitor on tele   - trend trop x3   - s/p aspirin load in ED   - c/w aspirin 81 mg OD   - start atorvastatin 80 mg OD   - BP is controlled, start ACE if tolerated   - get TTE   - will order nuclear stress test   - cardio eval   - check A1C, lipid profile, TSH   - will start ceftriaxone and azithromycin given new RLL opacity and SOB   - f/u urine strep, legionella, RVP     #DVT ppx:: lovenox   #GI ppx: NA   #Diet: NPO for stress test   #Activity: as tolerated   #Dispo: tele  Patient is a 64-year-old man with a history of HTN, HDL ( was on amlodipine and lipitor but stopped taking his meds ) has not seen primary care physician for routine physical exams in some time, current heavy  smoker ( 1 pack /day > 50 yrs ) presenting to the ED for shortness of breath for the past 4 days, without an obvious pattern, including nonexertional and exertional.    # Shortness of breath   #R/O ACS   #R/O CHF   - Vitals: T 97.7, HR 55, /74, spo2 96% on RA   - Labs: trop 43, pro-BNP 4029  - EKG: sinus with PVCs, prolonged QTc, ST and T wave changes ( present on prior EKG )   - CXR: RLL opacity, no congestion or effusion   - TTE 12/16/2021 showed    1.Left ventricular ejection fraction is estimated at 45-50%.  2.Mildly decreased global left ventricular systolic function.  3.Spectral Doppler shows impaired relaxation pattern of left ventricular myocardial filling   (Grade I diastolic dysfunction).  4.Mildly calcified aortic valve without stenosis.  5.Trivial aortic regurgitation.  - pt saw Dr. Reynolds in 2022 for pre-op clearance  Moh's surgery., recommended stress test as per pt but lost to f/u   - no leukocytosis, fever, cough, or other sxs of PNA     Plan:   - monitor on tele   - trend trop x3   - s/p aspirin load in ED   - c/w aspirin 81 mg OD   - start atorvastatin 80 mg OD   - BP is controlled, start ACE if tolerated   - get TTE   - will order nuclear stress test   - cardio eval   - check A1C, lipid profile, TSH     #RLL opacity   - pt known to ahve right lung central bronchiectasis   on CT non con done in Jan 2024   - will monitor off abx for now as no clinical sxs of PNA     #DVT ppx:: lovenox   #GI ppx: NA   #Diet: NPO for stress test   #Activity: as tolerated   #Dispo: tele  Patient is a 64-year-old man with a history of HTN, HDL ( was on amlodipine and lipitor but stopped taking his meds ) has not seen primary care physician for routine physical exams in some time, current heavy  smoker ( 1 pack /day > 50 yrs ) presenting to the ED for shortness of breath for the past 4 days, without an obvious pattern, including nonexertional and exertional.    # Shortness of breath   #R/O ACS   #R/O CHF   - Vitals: T 97.7, HR 55, /74, spo2 96% on RA   - Labs: trop 43, pro-BNP 4029  - EKG: sinus with PVCs, prolonged QTc, ST and T wave changes ( present on prior EKG )   - CXR: RLL opacity, no congestion or effusion   - TTE 12/16/2021 showed    1.Left ventricular ejection fraction is estimated at 45-50%.  2.Mildly decreased global left ventricular systolic function.  3.Spectral Doppler shows impaired relaxation pattern of left ventricular myocardial filling   (Grade I diastolic dysfunction).  4.Mildly calcified aortic valve without stenosis.  5.Trivial aortic regurgitation.  - pt saw Dr. Reynolds in 2022 for pre-op clearance  Moh's surgery., recommended stress test as per pt but lost to f/u   - no leukocytosis, fever, cough, or other sxs of PNA     Plan:   - monitor on tele   - trend trop x3   - s/p aspirin load in ED   - c/w aspirin 81 mg OD   - start atorvastatin 80 mg OD   - BP is controlled, start ACE if tolerated   - start metoprolol if HR tolerates ( currently HR 55-70 range )   - get TTE   - will order nuclear stress test   - cardio eval   - check A1C, lipid profile, TSH     #RLL opacity   - pt known to ahve right lung central bronchiectasis   on CT non con done in Jan 2024   - will monitor off abx for now as no clinical sxs of PNA     #DVT ppx:: lovenox   #GI ppx: NA   #Diet: NPO for stress test   #Activity: as tolerated   #Dispo: tele